# Patient Record
Sex: MALE | Race: WHITE | NOT HISPANIC OR LATINO | Employment: FULL TIME | URBAN - METROPOLITAN AREA
[De-identification: names, ages, dates, MRNs, and addresses within clinical notes are randomized per-mention and may not be internally consistent; named-entity substitution may affect disease eponyms.]

---

## 2018-01-23 ENCOUNTER — APPOINTMENT (OUTPATIENT)
Dept: LAB | Facility: CLINIC | Age: 43
End: 2018-01-23

## 2018-01-23 ENCOUNTER — TRANSCRIBE ORDERS (OUTPATIENT)
Dept: LAB | Facility: CLINIC | Age: 43
End: 2018-01-23

## 2018-01-23 DIAGNOSIS — Z02.1 PRE-EMPLOYMENT EXAMINATION: ICD-10-CM

## 2018-01-23 DIAGNOSIS — Z02.1 PRE-EMPLOYMENT EXAMINATION: Primary | ICD-10-CM

## 2018-01-23 LAB — RUBV IGG SERPL IA-ACNC: 40 IU/ML

## 2018-01-23 PROCEDURE — 86787 VARICELLA-ZOSTER ANTIBODY: CPT

## 2018-01-23 PROCEDURE — 86762 RUBELLA ANTIBODY: CPT

## 2018-01-23 PROCEDURE — 86765 RUBEOLA ANTIBODY: CPT

## 2018-01-23 PROCEDURE — 36415 COLL VENOUS BLD VENIPUNCTURE: CPT

## 2018-01-23 PROCEDURE — 86735 MUMPS ANTIBODY: CPT

## 2018-01-25 LAB
MEV IGG SER QL: NORMAL
MUV IGG SER QL: NORMAL
VZV IGG SER IA-ACNC: NORMAL

## 2018-08-15 ENCOUNTER — HOSPITAL ENCOUNTER (EMERGENCY)
Facility: HOSPITAL | Age: 43
Discharge: HOME | End: 2018-08-15
Attending: EMERGENCY MEDICINE | Admitting: EMERGENCY MEDICINE
Payer: COMMERCIAL

## 2018-08-15 ENCOUNTER — APPOINTMENT (EMERGENCY)
Dept: CT IMAGING | Facility: HOSPITAL | Age: 43
End: 2018-08-15
Payer: COMMERCIAL

## 2018-08-15 VITALS
BODY MASS INDEX: 31.1 KG/M2 | DIASTOLIC BLOOD PRESSURE: 69 MMHG | HEART RATE: 75 BPM | OXYGEN SATURATION: 95 % | HEIGHT: 69 IN | RESPIRATION RATE: 18 BRPM | WEIGHT: 210 LBS | SYSTOLIC BLOOD PRESSURE: 124 MMHG | TEMPERATURE: 98.2 F

## 2018-08-15 DIAGNOSIS — N23 RENAL COLIC ON LEFT SIDE: ICD-10-CM

## 2018-08-15 DIAGNOSIS — N13.2 URETERAL STONE WITH HYDRONEPHROSIS: Primary | ICD-10-CM

## 2018-08-15 LAB
ALBUMIN SERPL BCP-MCNC: 4.5 G/DL (ref 3.5–5.7)
ALP SERPL-CCNC: 43 U/L (ref 40–150)
ALT SERPL W P-5'-P-CCNC: 57 U/L (ref 7–52)
ANION GAP SERPL CALCULATED.3IONS-SCNC: 8 MMOL/L (ref 4–13)
AST SERPL W P-5'-P-CCNC: 31 U/L (ref 13–39)
BASOPHILS # BLD AUTO: 0.1 THOUSANDS/ΜL (ref 0–0.1)
BASOPHILS NFR BLD AUTO: 1 % (ref 0–1)
BILIRUB SERPL-MCNC: 0.4 MG/DL (ref 0.2–1)
BUN SERPL-MCNC: 24 MG/DL (ref 7–25)
CALCIUM SERPL-MCNC: 9.5 MG/DL (ref 8.6–10.5)
CHLORIDE SERPL-SCNC: 104 MMOL/L (ref 98–107)
CO2 SERPL-SCNC: 27 MMOL/L (ref 21–31)
CREAT SERPL-MCNC: 1.3 MG/DL (ref 0.7–1.3)
EOSINOPHIL # BLD AUTO: 0.1 THOUSAND/ΜL (ref 0–0.61)
EOSINOPHIL NFR BLD AUTO: 2 % (ref 0–6)
ERYTHROCYTE [DISTWIDTH] IN BLOOD BY AUTOMATED COUNT: 12.8 % (ref 11.6–15.1)
GFR SERPL CREATININE-BSD FRML MDRD: 66 ML/MIN/1.73SQ M
GLUCOSE SERPL-MCNC: 116 MG/DL (ref 65–140)
HCT VFR BLD AUTO: 46.3 % (ref 42–52)
HGB BLD-MCNC: 15.5 G/DL (ref 12–17)
LYMPHOCYTES # BLD AUTO: 1.3 THOUSANDS/ΜL (ref 0.6–4.47)
LYMPHOCYTES NFR BLD AUTO: 24 % (ref 14–44)
MCH RBC QN AUTO: 30 PG (ref 26.8–34.3)
MCHC RBC AUTO-ENTMCNC: 33.5 G/DL (ref 31.4–37.4)
MCV RBC AUTO: 90 FL (ref 82–98)
MONOCYTES # BLD AUTO: 0.4 THOUSAND/ΜL (ref 0.17–1.22)
MONOCYTES NFR BLD AUTO: 7 % (ref 4–12)
NEUTROPHILS # BLD AUTO: 3.7 THOUSANDS/ΜL (ref 1.85–7.62)
NEUTS SEG NFR BLD AUTO: 66 % (ref 43–75)
NRBC BLD AUTO-RTO: 0 /100 WBCS
PLATELET # BLD AUTO: 249 THOUSANDS/UL (ref 149–390)
PMV BLD AUTO: 8.1 FL (ref 8.9–12.7)
POTASSIUM SERPL-SCNC: 4.3 MMOL/L (ref 3.5–5.5)
PROT SERPL-MCNC: 6.9 G/DL (ref 6.4–8.9)
RBC # BLD AUTO: 5.17 MILLION/UL (ref 3.88–5.62)
SODIUM SERPL-SCNC: 139 MMOL/L (ref 134–143)
WBC # BLD AUTO: 5.5 THOUSAND/UL (ref 4.31–10.16)

## 2018-08-15 PROCEDURE — 96361 HYDRATE IV INFUSION ADD-ON: CPT

## 2018-08-15 PROCEDURE — 85025 COMPLETE CBC W/AUTO DIFF WBC: CPT | Performed by: EMERGENCY MEDICINE

## 2018-08-15 PROCEDURE — 80053 COMPREHEN METABOLIC PANEL: CPT | Performed by: EMERGENCY MEDICINE

## 2018-08-15 PROCEDURE — 99284 EMERGENCY DEPT VISIT MOD MDM: CPT

## 2018-08-15 PROCEDURE — 96375 TX/PRO/DX INJ NEW DRUG ADDON: CPT

## 2018-08-15 PROCEDURE — 36415 COLL VENOUS BLD VENIPUNCTURE: CPT | Performed by: EMERGENCY MEDICINE

## 2018-08-15 PROCEDURE — 74176 CT ABD & PELVIS W/O CONTRAST: CPT

## 2018-08-15 PROCEDURE — 96374 THER/PROPH/DIAG INJ IV PUSH: CPT

## 2018-08-15 RX ORDER — PRAVASTATIN SODIUM 20 MG
TABLET ORAL
COMMUNITY
End: 2022-04-07

## 2018-08-15 RX ORDER — ROSUVASTATIN CALCIUM 10 MG/1
TABLET, COATED ORAL
COMMUNITY
End: 2022-04-07

## 2018-08-15 RX ORDER — CYCLOBENZAPRINE HCL 5 MG
TABLET ORAL
COMMUNITY
End: 2022-04-07

## 2018-08-15 RX ORDER — KETOROLAC TROMETHAMINE 30 MG/ML
30 INJECTION, SOLUTION INTRAMUSCULAR; INTRAVENOUS ONCE
Status: COMPLETED | OUTPATIENT
Start: 2018-08-15 | End: 2018-08-15

## 2018-08-15 RX ORDER — ONDANSETRON 2 MG/ML
4 INJECTION INTRAMUSCULAR; INTRAVENOUS ONCE
Status: COMPLETED | OUTPATIENT
Start: 2018-08-15 | End: 2018-08-15

## 2018-08-15 RX ORDER — OXYCODONE HYDROCHLORIDE AND ACETAMINOPHEN 5; 325 MG/1; MG/1
1 TABLET ORAL EVERY 4 HOURS PRN
Qty: 20 TABLET | Refills: 0 | Status: SHIPPED | OUTPATIENT
Start: 2018-08-15 | End: 2018-08-25

## 2018-08-15 RX ORDER — SODIUM CHLORIDE 9 MG/ML
125 INJECTION, SOLUTION INTRAVENOUS CONTINUOUS
Status: DISCONTINUED | OUTPATIENT
Start: 2018-08-15 | End: 2018-08-15 | Stop reason: HOSPADM

## 2018-08-15 RX ORDER — TAMSULOSIN HYDROCHLORIDE 0.4 MG/1
0.4 CAPSULE ORAL
Qty: 10 CAPSULE | Refills: 0 | Status: SHIPPED | OUTPATIENT
Start: 2018-08-15 | End: 2022-04-07

## 2018-08-15 RX ADMIN — ONDANSETRON 4 MG: 2 INJECTION INTRAMUSCULAR; INTRAVENOUS at 05:11

## 2018-08-15 RX ADMIN — SODIUM CHLORIDE 125 ML/HR: 0.9 INJECTION, SOLUTION INTRAVENOUS at 05:10

## 2018-08-15 RX ADMIN — KETOROLAC TROMETHAMINE 30 MG: 30 INJECTION, SOLUTION INTRAMUSCULAR at 05:12

## 2018-08-15 NOTE — ED PROVIDER NOTES
History  Chief Complaint   Patient presents with    Abdominal Pain     reports left sided abdominal pain - reports does have history of back pain and when had x-rays of back incidentally told has left sided kidney stone - been awake since 01:30 due to pain     Patient is a 50-year-old male without significant past medical history who says that about 1:30 a m  developed pain in his left flank radiated to his left groin  He states never had this pain before  He is nauseous not vomiting  He has no frequency urgency or dysuria  He has noted nausea diarrhea or constipation  Weight is stable  The pain has become slightly worse  Of note patient had an x-ray about a year ago and told that he a kidney            None       No past medical history on file  No past surgical history on file  No family history on file  I have reviewed and agree with the history as documented  Social History   Substance Use Topics    Smoking status: Not on file    Smokeless tobacco: Not on file    Alcohol use Not on file        Review of Systems   Constitutional: Negative for chills, fatigue, fever and unexpected weight change  HENT: Negative for congestion and nosebleeds  Eyes: Negative for visual disturbance  Respiratory: Negative for chest tightness and shortness of breath  Cardiovascular: Negative for chest pain, palpitations and leg swelling  Gastrointestinal: Negative for abdominal pain, blood in stool, diarrhea, nausea and vomiting  Endocrine: Negative for cold intolerance and heat intolerance  Genitourinary: Negative for difficulty urinating  Musculoskeletal: Negative for arthralgias, back pain, gait problem, joint swelling and myalgias  Skin: Negative for rash  Neurological: Negative for dizziness, speech difficulty, weakness and headaches  Psychiatric/Behavioral: Negative for behavioral problems, confusion, self-injury and suicidal ideas     All other systems reviewed and are negative  Physical Exam  Physical Exam   Constitutional: He is oriented to person, place, and time  He appears well-developed and well-nourished  HENT:   Head: Normocephalic and atraumatic  Nose: Nose normal    Eyes: EOM are normal  Pupils are equal, round, and reactive to light  Neck: Normal range of motion  Neck supple  Cardiovascular: Normal rate, regular rhythm and normal heart sounds  Exam reveals no gallop and no friction rub  No murmur heard  Pulmonary/Chest: Effort normal and breath sounds normal  No respiratory distress  He has no wheezes  He has no rales  Abdominal: Soft  He exhibits no distension  There is no tenderness  There is no rebound and no guarding  Musculoskeletal: Normal range of motion  He exhibits no edema  Neurological: He is alert and oriented to person, place, and time  Skin: Skin is warm and dry  Psychiatric: He has a normal mood and affect  His behavior is normal  Judgment and thought content normal    Nursing note and vitals reviewed        Vital Signs  ED Triage Vitals [08/15/18 0448]   Temperature Pulse Respirations Blood Pressure SpO2   98 °F (36 7 °C) 68 18 142/80 95 %      Temp Source Heart Rate Source Patient Position - Orthostatic VS BP Location FiO2 (%)   Tympanic Monitor Sitting Left arm --      Pain Score       8           Vitals:    08/15/18 0448   BP: 142/80   Pulse: 68   Patient Position - Orthostatic VS: Sitting       Visual Acuity      ED Medications  Medications   sodium chloride 0 9 % infusion (not administered)   ketorolac (TORADOL) injection 30 mg (not administered)   ondansetron (ZOFRAN) injection 4 mg (not administered)       Diagnostic Studies  Results Reviewed     Procedure Component Value Units Date/Time    UA w Reflex to Microscopic [[id number]]     Lab Status:  No result Specimen:  Urine     Comprehensive metabolic panel [[id number]]     Lab Status:  No result Specimen:  Blood     CBC and differential [[id number]]     Lab Status:  No result Specimen:  Blood                  CT renal stone study abdomen pelvis without contrast    (Results Pending)              Procedures  Procedures       Phone Contacts  ED Phone Contact    ED Course                               [facility]  CritCare Time    Disposition  Final diagnoses:   None     ED Disposition     None      Follow-up Information    None         Patient's Medications    No medications on file     No discharge procedures on file      ED Provider  Electronically Signed by           Gilson Kwan MD  08/15/18 4487

## 2018-08-15 NOTE — ED NOTES
Patient resting quietly reporting slight relief from medications conversing with nurses - IV fluids infusing     Eros Murphy RN  08/15/18 [id number]

## 2018-08-15 NOTE — DISCHARGE INSTRUCTIONS
Hydronephrosis   WHAT YOU NEED TO KNOW:   Hydronephrosis is swelling in one or both kidneys caused by urine buildup  Urine normally flows from the kidneys to the bladder through tubes called ureters  A blockage in the ureters can prevent urine from flowing properly  Urine flow may also be prevented or slowed if your kidneys do not work correctly  Urine flows back into your urinary tract  Pressure builds up in the kidney and causes swelling  DISCHARGE INSTRUCTIONS:   Follow up with your healthcare provider or specialist as directed: You may need to be referred to a gynecologist, oncologist, or urologist for more tests and treatment  Write down your questions so you remember to ask them during your visits  Contact your healthcare provider if:   · Your abdomen feels full  · You have a change in how much or how often you urinate  · You urinate more times at night and in larger amounts than during the day  · You have mild lower back pain or pain on one side when you urinate  · You have questions or concerns about your condition or care  Return to the emergency department if:   · You have severe, stabbing back pain  · You have blood in your urine  · You cannot urinate, or you urinate very little  © 2017 [address] Information is for End User's use only and may not be sold, redistributed or otherwise used for commercial purposes  All illustrations and images included in CareNotes® are the copyrighted property of [employer] , [employer]  or Jasiel Landrum  The above information is an  only  It is not intended as medical advice for individual conditions or treatments  Talk to your doctor, nurse or pharmacist before following any medical regimen to see if it is safe and effective for you  Renal Colic   WHAT YOU NEED TO KNOW:   Renal colic is severe pain in your lower back or sides  The pain is usually on one side, but may be on both sides of your lower back  Renal colic may start quickly, come and go, and become worse over time  Renal colic is caused by a blockage in your urinary tract  The most common cause of a blockage is a kidney stone  Blood clots, ureter spasms, and dead tissue may also block your urinary tract  DISCHARGE INSTRUCTIONS:   Return to the emergency department if:   · You cannot stop vomiting  · You see new or increased bleeding when you urinate  · You are urinating less than usual, or not at all  · Your pain is not getting better even after treatment  Contact your healthcare provider if:   · You have fever  · You need to urinate more often than usual, or right away  · You see a stone in your urine strainer after you urinate  · You have questions or concerns about your condition or care  Medicines:   · Medicines  may help decrease pain and muscle spasms  You may also need medicine to calm your stomach and stop vomiting  · Take your medicine as directed  Contact your healthcare provider if you think your medicine is not helping or if you have side effects  Tell him of her if you are allergic to any medicine  Keep a list of the medicines, vitamins, and herbs you take  Include the amounts, and when and why you take them  Bring the list or the pill bottles to follow-up visits  Carry your medicine list with you in case of an emergency  Manage your symptoms:   · Drink liquids as directed  to help decrease pain and flush blockages from your urinary tract  Ask how much liquid to drink each day and which liquids are best for you  You may need to drink about 3 liters (12 glasses) of liquids each day  Half of your total daily liquids should be water  Limit coffee, tea, and soda to 2 cups daily  Your urine should be pale and clear  · Strain your urine every time you urinate  Urinate into a strainer (funnel with a fine mesh on the bottom) or glass jar to collect kidney stones   Give the kidney stones to your healthcare provider at your next visit  · Eat a variety of healthy foods  Healthy foods include fruits, vegetables, whole-grain breads, low-fat dairy products, beans, lean meats, and fish  You may need to increase the amount of citrus fruit you eat, such as oranges  Ask your healthcare provider how much salt, calcium, and protein you should eat  · Avoid activity in hot temperatures  Heat may cause you to become dehydrated and urinate less  Follow up with your healthcare provider as directed: You may need to return for tests to check if your blockage has cleared  Write down your questions so you remember to ask them during your visits  © 2017 [address] Information is for End User's use only and may not be sold, redistributed or otherwise used for commercial purposes  All illustrations and images included in CareNotes® are the copyrighted property of A D A M [employer]  or Jasiel Landrum  The above information is an  only  It is not intended as medical advice for individual conditions or treatments  Talk to your doctor, nurse or pharmacist before following any medical regimen to see if it is safe and effective for you

## 2018-08-15 NOTE — ED NOTES
md at bedside - patient to be discharged - urine strainer provided     Rito Galvan RN  08/15/18 7666

## 2018-08-15 NOTE — ED NOTES
Patient unable to void at this time - MD evaluated patient - IV being inserted and labs drawn - patient for CT scan     Yoselin Dela Cruz RN  08/15/18 8149

## 2020-01-23 ENCOUNTER — OFFICE VISIT (OUTPATIENT)
Dept: URGENT CARE | Facility: HOSPITAL | Age: 45
End: 2020-01-23
Payer: COMMERCIAL

## 2020-01-23 VITALS
SYSTOLIC BLOOD PRESSURE: 154 MMHG | OXYGEN SATURATION: 93 % | RESPIRATION RATE: 18 BRPM | DIASTOLIC BLOOD PRESSURE: 104 MMHG | BODY MASS INDEX: 31.64 KG/M2 | HEIGHT: 70 IN | HEART RATE: 118 BPM | WEIGHT: 221 LBS | TEMPERATURE: 99.1 F

## 2020-01-23 DIAGNOSIS — J11.1 INFLUENZA-LIKE ILLNESS: Primary | ICD-10-CM

## 2020-01-23 PROCEDURE — 99203 OFFICE O/P NEW LOW 30 MIN: CPT | Performed by: EMERGENCY MEDICINE

## 2020-01-23 RX ORDER — NAPROXEN 500 MG/1
TABLET ORAL
COMMUNITY
End: 2022-04-07

## 2020-01-23 RX ORDER — OSELTAMIVIR PHOSPHATE 75 MG/1
75 CAPSULE ORAL EVERY 12 HOURS SCHEDULED
Qty: 10 CAPSULE | Refills: 0 | Status: SHIPPED | OUTPATIENT
Start: 2020-01-23 | End: 2020-01-28

## 2020-01-23 NOTE — PROGRESS NOTES
[facility]        NAME: Cheri Gibbs is a 38 y o  male  : 1975    MRN: [id number]  DATE: 2020  TIME: 5:53 PM    Assessment and Plan   Influenza-like illness [R69]  1  Influenza-like illness  oseltamivir (TAMIFLU) 75 mg capsule         Patient Instructions     Patient Instructions     1  Claritin 12 hour for nasal stuffiness  2  Mucinex 600 mgs every 12 hours for cough  3  Humidify air in bedroom  4  Tylenol or Motrin for body aches  Influenza   WHAT YOU NEED TO KNOW:   Influenza (the flu) is an infection caused by the influenza virus  The flu is easily spread when an infected person coughs, sneezes, or has close contact with others  You may be able to spread the flu to others for 1 week or longer after signs or symptoms appear  DISCHARGE INSTRUCTIONS:   Call 911 for any of the following:   · You have trouble breathing, and your lips look purple or blue  · You have a seizure  Return to the emergency department if:   · You are dizzy, or you are urinating less or not at all  · You have a headache with a stiff neck, and you feel tired or confused  · You have new pain or pressure in your chest     · Your symptoms, such as shortness of breath, vomiting, or diarrhea, get worse  · Your symptoms, such as fever and coughing, seem to get better, but then get worse  Contact your healthcare provider if:   · You have new muscle pain or weakness  · You have questions or concerns about your condition or care  Medicines: You may need any of the following:  · Acetaminophen  decreases pain and fever  It is available without a doctor's order  Ask how much to take and how often to take it  Follow directions  Acetaminophen can cause liver damage if not taken correctly  · NSAIDs , such as ibuprofen, help decrease swelling, pain, and fever  This medicine is available with or without a doctor's order  NSAIDs can cause stomach bleeding or kidney problems in certain people  If you take blood thinner medicine, always ask your healthcare provider if NSAIDs are safe for you  Always read the medicine label and follow directions  · Antivirals  help fight a viral infection  · Take your medicine as directed  Contact your healthcare provider if you think your medicine is not helping or if you have side effects  Tell him or her if you are allergic to any medicine  Keep a list of the medicines, vitamins, and herbs you take  Include the amounts, and when and why you take them  Bring the list or the pill bottles to follow-up visits  Carry your medicine list with you in case of an emergency  Rest  as much as you can to help you recover  Drink liquids as directed  to help prevent dehydration  Ask how much liquid to drink each day and which liquids are best for you  Prevent the spread of influenza:   · Wash your hands often  Use soap and water  Wash your hands after you use the bathroom, change a child's diapers, or sneeze  Wash your hands before you prepare or eat food  Use gel hand cleanser when soap and water are not available  Do not touch your eyes, nose, or mouth unless you have washed your hands first            · Cover your mouth when you sneeze or cough  Cough into a tissue or the bend of your arm  · Clean shared items with a germ-killing   Clean table surfaces, doorknobs, and light switches  Do not share towels, silverware, and dishes with people who are sick  Wash bed sheets, towels, silverware, and dishes with soap and water  · Wear a mask  over your mouth and nose if you are sick or are near anyone who is sick  · Stay away from others  if you are sick  · Influenza vaccine  helps prevent influenza (flu)  Everyone older than 6 months should get a yearly influenza vaccine  Get the vaccine as soon as it is available, usually in September or October each year    Follow up with your healthcare provider as directed:  Write down your questions so you remember to ask them during your visits  © 2017 [address] Information is for End User's use only and may not be sold, redistributed or otherwise used for commercial purposes  All illustrations and images included in CareNotes® are the copyrighted property of A D A M [employer] Inc  or Jasiel Landrum  The above information is an  only  It is not intended as medical advice for individual conditions or treatments  Talk to your doctor, nurse or pharmacist before following any medical regimen to see if it is safe and effective for you  Follow up with PCP in 3-5 days  Proceed to  ER if symptoms worsen  Chief Complaint     Chief Complaint   Patient presents with    Generalized Body Aches     started a few days ago     Chills    Fatigue         History of Present Illness       THIS IS A 38YEAR-OLD MALE WHO PRESENTS WITH 2 DAYS OF BODY ACHES, LOW-GRADE FEVER, COUGH WITH SCANT SPUTUM  HE HAS NOT HAD A FLU SHOT THIS YEAR      Review of Systems   Review of Systems   Constitutional: Positive for fever  HENT: Positive for congestion and rhinorrhea  Negative for sore throat  Eyes: Negative for photophobia and discharge  Respiratory: Positive for cough  Gastrointestinal: Negative for diarrhea, nausea and vomiting  Musculoskeletal: Positive for myalgias  Skin: Negative for rash           Current Medications       Current Outpatient Medications:     naproxen (NAPROSYN) 500 mg tablet, naproxen 500 mg tablet  take 1 tablet by mouth twice a day with food, Disp: , Rfl:     cyclobenzaprine (FLEXERIL) 5 mg tablet, cyclobenzaprine 5 mg tablet, Disp: , Rfl:     oseltamivir (TAMIFLU) 75 mg capsule, Take 1 capsule (75 mg total) by mouth every 12 (twelve) hours for 5 days, Disp: 10 capsule, Rfl: 0    pravastatin (PRAVACHOL) 20 mg tablet, pravastatin 20 mg tablet, Disp: , Rfl:     rosuvastatin (CRESTOR) 10 MG tablet, rosuvastatin 10 mg tablet, Disp: , Rfl:     tamsulosin (FLOMAX) 0 4 mg, Take 1 capsule (0 4 mg total) by mouth daily with dinner (Patient not taking: Reported on 1/23/2020), Disp: 10 capsule, Rfl: 0    Current Allergies     Allergies as of 01/23/2020    (No Known Allergies)            The following portions of the patient's history were reviewed and updated as appropriate: allergies, current medications, past family history, past medical history, past social history, past surgical history and problem list      Past Medical History:   Diagnosis Date    Hyperlipidemia        Past Surgical History:   Procedure Laterality Date    HAND EXTENSOR TENDON EXCISION      KNEE ARTHROSCOPY         History reviewed  No pertinent family history  Medications have been verified  Objective   BP (!) 154/104   Pulse (!) 118   Temp 99 1 °F (37 3 °C) (Tympanic)   Resp 18   Ht 5' 10" (1 778 m)   Wt 100 kg (221 lb)   SpO2 93%   BMI 31 71 kg/m²        Physical Exam     Physical Exam   Constitutional: He is oriented to person, place, and time  He appears well-developed and well-nourished  HENT:   Head: Normocephalic and atraumatic  Right Ear: External ear normal    Left Ear: External ear normal    Mouth/Throat: Oropharynx is clear and moist  No oropharyngeal exudate  Minimal discharge bilaterally from the nasal cavity  Eyes: Pupils are equal, round, and reactive to light  Conjunctivae and EOM are normal    Neck: Normal range of motion  Neck supple  Cardiovascular: Normal rate, regular rhythm and normal heart sounds  No murmur heard  Pulmonary/Chest: Effort normal and breath sounds normal  He has no wheezes  He has no rales  Abdominal: Soft  He exhibits no distension and no mass  There is no tenderness  There is no guarding  Musculoskeletal: Normal range of motion  He exhibits no edema  Lymphadenopathy:     He has no cervical adenopathy  Neurological: He is alert and oriented to person, place, and time  Skin: Skin is warm and dry  No rash noted  No erythema  Psychiatric: He has a normal mood and affect  His behavior is normal    Nursing note and vitals reviewed

## 2020-01-23 NOTE — PATIENT INSTRUCTIONS
1   Claritin 12 hour for nasal stuffiness  2  Mucinex 600 mgs every 12 hours for cough  3  Humidify air in bedroom  4  Tylenol or Motrin for body aches  Influenza   WHAT YOU NEED TO KNOW:   Influenza (the flu) is an infection caused by the influenza virus  The flu is easily spread when an infected person coughs, sneezes, or has close contact with others  You may be able to spread the flu to others for 1 week or longer after signs or symptoms appear  DISCHARGE INSTRUCTIONS:   Call 911 for any of the following:   · You have trouble breathing, and your lips look purple or blue  · You have a seizure  Return to the emergency department if:   · You are dizzy, or you are urinating less or not at all  · You have a headache with a stiff neck, and you feel tired or confused  · You have new pain or pressure in your chest     · Your symptoms, such as shortness of breath, vomiting, or diarrhea, get worse  · Your symptoms, such as fever and coughing, seem to get better, but then get worse  Contact your healthcare provider if:   · You have new muscle pain or weakness  · You have questions or concerns about your condition or care  Medicines: You may need any of the following:  · Acetaminophen  decreases pain and fever  It is available without a doctor's order  Ask how much to take and how often to take it  Follow directions  Acetaminophen can cause liver damage if not taken correctly  · NSAIDs , such as ibuprofen, help decrease swelling, pain, and fever  This medicine is available with or without a doctor's order  NSAIDs can cause stomach bleeding or kidney problems in certain people  If you take blood thinner medicine, always ask your healthcare provider if NSAIDs are safe for you  Always read the medicine label and follow directions  · Antivirals  help fight a viral infection  · Take your medicine as directed    Contact your healthcare provider if you think your medicine is not helping or if you have side effects  Tell him or her if you are allergic to any medicine  Keep a list of the medicines, vitamins, and herbs you take  Include the amounts, and when and why you take them  Bring the list or the pill bottles to follow-up visits  Carry your medicine list with you in case of an emergency  Rest  as much as you can to help you recover  Drink liquids as directed  to help prevent dehydration  Ask how much liquid to drink each day and which liquids are best for you  Prevent the spread of influenza:   · Wash your hands often  Use soap and water  Wash your hands after you use the bathroom, change a child's diapers, or sneeze  Wash your hands before you prepare or eat food  Use gel hand cleanser when soap and water are not available  Do not touch your eyes, nose, or mouth unless you have washed your hands first            · Cover your mouth when you sneeze or cough  Cough into a tissue or the bend of your arm  · Clean shared items with a germ-killing   Clean table surfaces, doorknobs, and light switches  Do not share towels, silverware, and dishes with people who are sick  Wash bed sheets, towels, silverware, and dishes with soap and water  · Wear a mask  over your mouth and nose if you are sick or are near anyone who is sick  · Stay away from others  if you are sick  · Influenza vaccine  helps prevent influenza (flu)  Everyone older than 6 months should get a yearly influenza vaccine  Get the vaccine as soon as it is available, usually in September or October each year  Follow up with your healthcare provider as directed:  Write down your questions so you remember to ask them during your visits  © 2017 [address]  Information is for End User's use only and may not be sold, redistributed or otherwise used for commercial purposes  All illustrations and images included in CareNotes® are the copyrighted property of [employer]  or [employer]iel Landrum    The above information is an  only  It is not intended as medical advice for individual conditions or treatments  Talk to your doctor, nurse or pharmacist before following any medical regimen to see if it is safe and effective for you

## 2021-04-08 DIAGNOSIS — Z23 ENCOUNTER FOR IMMUNIZATION: ICD-10-CM

## 2022-04-07 ENCOUNTER — OFFICE VISIT (OUTPATIENT)
Dept: FAMILY MEDICINE CLINIC | Facility: CLINIC | Age: 47
End: 2022-04-07
Payer: COMMERCIAL

## 2022-04-07 VITALS
SYSTOLIC BLOOD PRESSURE: 134 MMHG | DIASTOLIC BLOOD PRESSURE: 84 MMHG | OXYGEN SATURATION: 98 % | HEART RATE: 71 BPM | HEIGHT: 69 IN | WEIGHT: 213 LBS | TEMPERATURE: 98 F | BODY MASS INDEX: 31.55 KG/M2

## 2022-04-07 DIAGNOSIS — Z11.59 ENCOUNTER FOR HEPATITIS C SCREENING TEST FOR LOW RISK PATIENT: ICD-10-CM

## 2022-04-07 DIAGNOSIS — Z11.4 SCREENING FOR HIV (HUMAN IMMUNODEFICIENCY VIRUS): ICD-10-CM

## 2022-04-07 DIAGNOSIS — Z83.49 FAMILY HISTORY OF THYROID DISEASE IN FATHER: ICD-10-CM

## 2022-04-07 DIAGNOSIS — K21.9 GASTROESOPHAGEAL REFLUX DISEASE WITHOUT ESOPHAGITIS: ICD-10-CM

## 2022-04-07 DIAGNOSIS — Z13.1 SCREENING FOR DIABETES MELLITUS (DM): ICD-10-CM

## 2022-04-07 DIAGNOSIS — Z00.00 ANNUAL PHYSICAL EXAM: Primary | ICD-10-CM

## 2022-04-07 PROCEDURE — 3725F SCREEN DEPRESSION PERFORMED: CPT | Performed by: NURSE PRACTITIONER

## 2022-04-07 PROCEDURE — 1036F TOBACCO NON-USER: CPT | Performed by: NURSE PRACTITIONER

## 2022-04-07 PROCEDURE — 99386 PREV VISIT NEW AGE 40-64: CPT | Performed by: NURSE PRACTITIONER

## 2022-04-07 PROCEDURE — 3008F BODY MASS INDEX DOCD: CPT | Performed by: NURSE PRACTITIONER

## 2022-04-07 RX ORDER — PANTOPRAZOLE SODIUM 40 MG/1
40 TABLET, DELAYED RELEASE ORAL DAILY
Qty: 90 TABLET | Refills: 3 | Status: SHIPPED | OUTPATIENT
Start: 2022-04-07

## 2022-04-07 RX ORDER — OMEPRAZOLE 20 MG/1
TABLET, DELAYED RELEASE ORAL
COMMUNITY
Start: 2022-01-10 | End: 2022-04-07

## 2022-04-07 NOTE — PROGRESS NOTES
ADULT ANNUAL [facility]    NAME: Domingo Espinoza  AGE: 49 y o  SEX: male  : 1975     DATE: 2022     Assessment and Plan:     Problem List Items Addressed This Visit     None      Visit Diagnoses     Annual physical exam    -  Primary    Relevant Orders    Lipid panel    Comprehensive metabolic panel    TSH, 3rd generation with Free T4 reflex    CBC and differential    Family history of thyroid disease in father        Relevant Orders    TSH, 3rd generation with Free T4 reflex    Gastroesophageal reflux disease without esophagitis        Relevant Medications    pantoprazole (PROTONIX) 40 mg tablet    Screening for HIV (human immunodeficiency virus)        Relevant Orders    HIV 1/2 Antigen/Antibody (4th Generation) w Reflex [facility]    Encounter for hepatitis C screening test for low risk patient        Relevant Orders    Hepatitis C antibody    Screening for diabetes mellitus (DM)        Relevant Orders    HEMOGLOBIN A1C W/ EAG ESTIMATION      1  Annual physical exam    - Lipid panel; Future  - Comprehensive metabolic panel; Future  - TSH, 3rd generation with Free T4 reflex; Future  - CBC and differential; Future    2  Family history of thyroid disease in father  - TSH, 3rd generation with Free T4 reflex; Future    3  Gastroesophageal reflux disease without esophagitis  Coughing due to acid reflux  Dentist has bene telling him he had acid reflux as it was affecting his lower teeth  Was taking OTC 20mg PPI with some relief  Will try Protonix  - pantoprazole (PROTONIX) 40 mg tablet; Take 1 tablet (40 mg total) by mouth daily  Dispense: 90 tablet; Refill: 3    4  Screening for HIV (human immunodeficiency virus)    - HIV 1/2 Antigen/Antibody (4th Generation) w Reflex [facility]; Future    5  Encounter for hepatitis C screening test for low risk patient  - Hepatitis C antibody; Future    6   Screening for diabetes mellitus (DM)    - HEMOGLOBIN A1C W/ EAG ESTIMATION; Future      Immunizations and preventive care screenings were discussed with patient today  Appropriate education was printed on patient's after visit summary  Counseling:  Alcohol/drug use: discussed moderation in alcohol intake, the recommendations for healthy alcohol use, and avoidance of illicit drug use  Sexual health: discussed sexually transmitted diseases, partner selection, use of condoms, avoidance of unintended pregnancy, and contraceptive alternatives  · Exercise: the importance of regular exercise/physical activity was discussed  Recommend exercise 3-5 times per week for at least 30 minutes  BMI Counseling: Body mass index is 31 45 kg/m²  The BMI is above normal  Nutrition recommendations include encouraging healthy choices of fruits and vegetables, limiting drinks that contain sugar, moderation in carbohydrate intake, reducing intake of saturated and trans fat and reducing intake of cholesterol  Exercise recommendations include exercising 3-5 times per week  Rationale for BMI follow-up plan is due to patient being overweight or obese  Depression Screening and Follow-up Plan: Patient was screened for depression during today's encounter  They screened negative with a PHQ-2 score of 0  No follow-ups on file  Chief Complaint:     Chief Complaint   Patient presents with   [address] [place] [address] Annual Exam     Has bump on stomach  Is wondering if it is a hernia  Also mentions acid reflux and cough  History of Present Illness:     Adult Annual Physical   Patient here for a comprehensive physical exam  The patient reports problems - see below  Answers for HPI/ROS submitted by the patient on 4/5/2022  Chronicity: chronic  Onset: more than 1 month ago  Progression since onset: gradually worsening  Frequency: every few hours  Cough characteristics: non-productive  ear congestion: No  heartburn: No  hemoptysis: No  nasal congestion: No  sweats:  No  weight loss: No  Aggravated by: exercise, lying down      Diet and Physical Activity  · Diet/Nutrition: poor diet, limited junk food, limited fruits/vegetables and reports carnivore  · Exercise: moderate cardiovascular exercise and 3-4 times a week on average  Depression Screening  PHQ-2/9 Depression Screening    Little interest or pleasure in doing things: 0 - not at all  Feeling down, depressed, or hopeless: 0 - not at all  PHQ-2 Score: 0  PHQ-2 Interpretation: Negative depression screen       General Health  · Sleep: sleeps well  · Hearing: normal - bilateral   · Vision: most recent eye exam <1 year ago  · Dental: regular dental visits and brushes teeth twice daily   Health  · Symptoms include: none     Review of Systems:     Review of Systems   Constitutional: Negative for chills and fever  HENT: Negative for ear pain, postnasal drip, rhinorrhea and sore throat  Respiratory: Positive for cough and shortness of breath  Negative for wheezing  Cardiovascular: Negative for chest pain  Musculoskeletal: Negative for myalgias  Skin: Negative for rash  Neurological: Negative for headaches  Past Medical History:     Past Medical History:   Diagnosis Date    Hyperlipidemia       Past Surgical History:     Past Surgical History:   Procedure Laterality Date    HAND EXTENSOR TENDON EXCISION      KNEE ARTHROSCOPY        Family History:     No family history on file     Social History:     Social History     Socioeconomic History    Marital status: Single     Spouse name: None    Number of children: None    Years of education: None    Highest education level: None   Occupational History    None   Tobacco Use    Smoking status: Never Smoker    Smokeless tobacco: Never Used   Substance and Sexual Activity    Alcohol use: Never     Comment: reports socially drinks alcohol    Drug use: Never    Sexual activity: None   Other Topics Concern    None   Social History Narrative    None     Social Determinants of Health     Financial Resource Strain: Not on file   Food Insecurity: Not on file   Transportation Needs: Not on file   Physical Activity: Not on file   Stress: Not on file   Social Connections: Not on file   Intimate Partner Violence: Not on file   Housing Stability: Not on file      Current Medications:     Current Outpatient Medications   Medication Sig Dispense Refill    pantoprazole (PROTONIX) 40 mg tablet Take 1 tablet (40 mg total) by mouth daily 90 tablet 3     No current facility-administered medications for this visit  Allergies:     No Known Allergies   Physical Exam:     /84   Pulse 71   Temp 98 °F (36 7 °C)   Ht 5' 9" (1 753 m)   Wt 96 6 kg (213 lb)   SpO2 98%   BMI 31 45 kg/m²     Physical Exam  Vitals and nursing note reviewed  Constitutional:       General: He is not in acute distress  Appearance: He is well-developed  He is not diaphoretic  HENT:      Head: Normocephalic and atraumatic  Right Ear: Tympanic membrane, ear canal and external ear normal       Left Ear: Tympanic membrane and ear canal normal       Nose: Nose normal  No congestion or rhinorrhea  Mouth/Throat:      Mouth: Mucous membranes are moist       Pharynx: No oropharyngeal exudate  Cardiovascular:      Rate and Rhythm: Normal rate and regular rhythm  Heart sounds: Normal heart sounds  No murmur heard  Pulmonary:      Effort: Pulmonary effort is normal  No respiratory distress  Breath sounds: Normal breath sounds  No wheezing or rales  Abdominal:      General: Bowel sounds are normal  There is no distension  Palpations: Abdomen is soft  Tenderness: There is no abdominal tenderness  There is no guarding  Hernia: No hernia is present  Musculoskeletal:         General: Normal range of motion  Skin:     General: Skin is warm and dry  Capillary Refill: Capillary refill takes less than 2 seconds  Findings: No erythema or rash     Neurological: Mental Status: He is alert  Psychiatric:         Behavior: Behavior normal  Behavior is cooperative  Thought Content:  Thought content normal           DEVANG Garcia  [facility]

## 2022-04-07 NOTE — PATIENT INSTRUCTIONS

## 2022-04-08 ENCOUNTER — APPOINTMENT (OUTPATIENT)
Dept: LAB | Facility: CLINIC | Age: 47
End: 2022-04-08
Payer: COMMERCIAL

## 2022-04-08 DIAGNOSIS — Z13.1 SCREENING FOR DIABETES MELLITUS (DM): ICD-10-CM

## 2022-04-08 DIAGNOSIS — Z00.00 ANNUAL PHYSICAL EXAM: ICD-10-CM

## 2022-04-08 DIAGNOSIS — Z11.59 ENCOUNTER FOR HEPATITIS C SCREENING TEST FOR LOW RISK PATIENT: ICD-10-CM

## 2022-04-08 DIAGNOSIS — Z11.4 SCREENING FOR HIV (HUMAN IMMUNODEFICIENCY VIRUS): ICD-10-CM

## 2022-04-08 DIAGNOSIS — Z83.49 FAMILY HISTORY OF THYROID DISEASE IN FATHER: ICD-10-CM

## 2022-04-08 LAB
ALBUMIN SERPL BCP-MCNC: 4.3 G/DL (ref 3.5–5)
ALP SERPL-CCNC: 61 U/L (ref 46–116)
ALT SERPL W P-5'-P-CCNC: 55 U/L (ref 12–78)
ANION GAP SERPL CALCULATED.3IONS-SCNC: 4 MMOL/L (ref 4–13)
AST SERPL W P-5'-P-CCNC: 30 U/L (ref 5–45)
BASOPHILS # BLD AUTO: 0.05 THOUSANDS/ΜL (ref 0–0.1)
BASOPHILS NFR BLD AUTO: 1 % (ref 0–1)
BILIRUB SERPL-MCNC: 0.69 MG/DL (ref 0.2–1)
BUN SERPL-MCNC: 25 MG/DL (ref 5–25)
CALCIUM SERPL-MCNC: 9.5 MG/DL (ref 8.3–10.1)
CHLORIDE SERPL-SCNC: 106 MMOL/L (ref 100–108)
CHOLEST SERPL-MCNC: 259 MG/DL
CO2 SERPL-SCNC: 29 MMOL/L (ref 21–32)
CREAT SERPL-MCNC: 1.17 MG/DL (ref 0.6–1.3)
EOSINOPHIL # BLD AUTO: 0.1 THOUSAND/ΜL (ref 0–0.61)
EOSINOPHIL NFR BLD AUTO: 2 % (ref 0–6)
ERYTHROCYTE [DISTWIDTH] IN BLOOD BY AUTOMATED COUNT: 12.2 % (ref 11.6–15.1)
EST. AVERAGE GLUCOSE BLD GHB EST-MCNC: 103 MG/DL
GFR SERPL CREATININE-BSD FRML MDRD: 73 ML/MIN/1.73SQ M
GLUCOSE P FAST SERPL-MCNC: 101 MG/DL (ref 65–99)
HBA1C MFR BLD: 5.2 %
HCT VFR BLD AUTO: 45.9 % (ref 36.5–49.3)
HCV AB SER QL: NORMAL
HDLC SERPL-MCNC: 45 MG/DL
HGB BLD-MCNC: 15.4 G/DL (ref 12–17)
IMM GRANULOCYTES # BLD AUTO: 0.02 THOUSAND/UL (ref 0–0.2)
IMM GRANULOCYTES NFR BLD AUTO: 0 % (ref 0–2)
LDLC SERPL CALC-MCNC: 140 MG/DL (ref 0–100)
LYMPHOCYTES # BLD AUTO: 1.48 THOUSANDS/ΜL (ref 0.6–4.47)
LYMPHOCYTES NFR BLD AUTO: 29 % (ref 14–44)
MCH RBC QN AUTO: 29.9 PG (ref 26.8–34.3)
MCHC RBC AUTO-ENTMCNC: 33.6 G/DL (ref 31.4–37.4)
MCV RBC AUTO: 89 FL (ref 82–98)
MONOCYTES # BLD AUTO: 0.46 THOUSAND/ΜL (ref 0.17–1.22)
MONOCYTES NFR BLD AUTO: 9 % (ref 4–12)
NEUTROPHILS # BLD AUTO: 3.09 THOUSANDS/ΜL (ref 1.85–7.62)
NEUTS SEG NFR BLD AUTO: 59 % (ref 43–75)
NONHDLC SERPL-MCNC: 214 MG/DL
NRBC BLD AUTO-RTO: 0 /100 WBCS
PLATELET # BLD AUTO: 253 THOUSANDS/UL (ref 149–390)
PMV BLD AUTO: 10.7 FL (ref 8.9–12.7)
POTASSIUM SERPL-SCNC: 4.1 MMOL/L (ref 3.5–5.3)
PROT SERPL-MCNC: 7.5 G/DL (ref 6.4–8.2)
RBC # BLD AUTO: 5.15 MILLION/UL (ref 3.88–5.62)
SODIUM SERPL-SCNC: 139 MMOL/L (ref 136–145)
TRIGL SERPL-MCNC: 371 MG/DL
TSH SERPL DL<=0.05 MIU/L-ACNC: 1.04 UIU/ML (ref 0.45–4.5)
WBC # BLD AUTO: 5.2 THOUSAND/UL (ref 4.31–10.16)

## 2022-04-08 PROCEDURE — 85025 COMPLETE CBC W/AUTO DIFF WBC: CPT

## 2022-04-08 PROCEDURE — 84443 ASSAY THYROID STIM HORMONE: CPT

## 2022-04-08 PROCEDURE — 83036 HEMOGLOBIN GLYCOSYLATED A1C: CPT

## 2022-04-08 PROCEDURE — 36415 COLL VENOUS BLD VENIPUNCTURE: CPT

## 2022-04-08 PROCEDURE — 86803 HEPATITIS C AB TEST: CPT

## 2022-04-08 PROCEDURE — 80061 LIPID PANEL: CPT

## 2022-04-08 PROCEDURE — 80053 COMPREHEN METABOLIC PANEL: CPT

## 2022-04-08 PROCEDURE — 87389 HIV-1 AG W/HIV-1&-2 AB AG IA: CPT

## 2022-04-09 LAB — HIV 1+2 AB+HIV1 P24 AG SERPL QL IA: NORMAL

## 2022-04-13 DIAGNOSIS — E78.00 ELEVATED CHOLESTEROL: Primary | ICD-10-CM

## 2023-04-07 DIAGNOSIS — K21.9 GASTROESOPHAGEAL REFLUX DISEASE WITHOUT ESOPHAGITIS: ICD-10-CM

## 2023-04-07 RX ORDER — PANTOPRAZOLE SODIUM 40 MG/1
40 TABLET, DELAYED RELEASE ORAL DAILY
Qty: 90 TABLET | Refills: 3 | Status: SHIPPED | OUTPATIENT
Start: 2023-04-07

## 2023-04-07 NOTE — TELEPHONE ENCOUNTER
----- Message from KIMANI Antonio sent at 4/7/2023  8:41 AM EDT -----  Regarding: RE: Acid Reflux Back  Contact: [phone]  Please enter protonix refill      ----- Message -----  From: Alana Castle  Sent: 4/7/2023   6:34 AM EDT  To: DEVANG Clemens  Subject: FW: Acid Reflux Back                               ----- Message -----  From: Bethanie Sifuentes  Sent: 4/6/2023   7:20 PM EDT  To: [facility]  Subject: Acid Reflux Back                                 Hi, I had to reschedule my appointment to May  I noticed though that I only have enough of the protonix for 3 more days with no refills on the bottle  Should I get a refill of this?     Thanks,  Dong Berman

## 2023-05-05 ENCOUNTER — OFFICE VISIT (OUTPATIENT)
Dept: FAMILY MEDICINE CLINIC | Facility: CLINIC | Age: 48
End: 2023-05-05

## 2023-05-05 VITALS
WEIGHT: 222 LBS | SYSTOLIC BLOOD PRESSURE: 148 MMHG | TEMPERATURE: 97.8 F | OXYGEN SATURATION: 96 % | HEIGHT: 69 IN | HEART RATE: 70 BPM | BODY MASS INDEX: 32.88 KG/M2 | DIASTOLIC BLOOD PRESSURE: 100 MMHG

## 2023-05-05 DIAGNOSIS — I10 PRIMARY HYPERTENSION: ICD-10-CM

## 2023-05-05 DIAGNOSIS — K21.9 GASTROESOPHAGEAL REFLUX DISEASE WITHOUT ESOPHAGITIS: ICD-10-CM

## 2023-05-05 DIAGNOSIS — Z12.11 COLON CANCER SCREENING: Primary | ICD-10-CM

## 2023-05-05 DIAGNOSIS — E78.2 MIXED HYPERLIPIDEMIA: ICD-10-CM

## 2023-05-05 DIAGNOSIS — Z00.00 ANNUAL PHYSICAL EXAM: ICD-10-CM

## 2023-05-05 DIAGNOSIS — Z83.49 FAMILY HISTORY OF THYROID DISEASE IN FATHER: ICD-10-CM

## 2023-05-05 RX ORDER — LISINOPRIL 10 MG/1
10 TABLET ORAL DAILY
Qty: 30 TABLET | Refills: 3 | Status: SHIPPED | OUTPATIENT
Start: 2023-05-05

## 2023-05-05 RX ORDER — FAMOTIDINE 40 MG/1
40 TABLET, FILM COATED ORAL DAILY
COMMUNITY

## 2023-05-05 NOTE — PATIENT INSTRUCTIONS
Diet for Stomach Ulcers and Gastritis   WHAT YOU NEED TO KNOW:   A diet for stomach ulcers and gastritis is a meal plan that limits foods that irritate your stomach  Certain foods may worsen symptoms such as stomach pain, bloating, heartburn, or indigestion  DISCHARGE INSTRUCTIONS:   Foods to limit or avoid:  You may need to avoid acidic, spicy, or high-fat foods  Not all foods affect everyone the same way  You will need to learn which foods worsen your symptoms and limit those foods  The following are some foods that may worsen ulcer or gastritis symptoms:  Beverages:      Whole milk and chocolate milk    Hot cocoa and cola    Any beverage with caffeine    Regular and decaffeinated coffee    Peppermint and spearmint tea    Green and black tea, with or without caffeine    Orange and grapefruit juices    Drinks that contain alcohol    Spices and seasonings:      Black and red pepper    Chili powder    Mustard seed and nutmeg    Other foods:      Dairy foods made from whole milk or cream    Chocolate    Spicy or strongly flavored cheeses, such as jalapeno or black pepper    Highly seasoned, high-fat meats, such as sausage, salami, bhatia, ham, and cold cuts    Hot chiles and peppers    Tomato products, such as tomato paste, tomato sauce, or tomato juice    Foods to include:  Eat a variety of healthy foods from all the food groups  Eat fruits, vegetables, whole grains, and fat-free or low-fat dairy foods  Whole grains include whole-wheat breads, cereals, pasta, and brown rice  Choose lean meats, poultry (chicken and turkey), fish, beans, eggs, and nuts  A healthy meal plan is low in unhealthy fats, salt, and added sugar  Healthy fats include olive oil and canola oil  Ask your dietitian for more information about a healthy diet  Other helpful guidelines:   Do not eat right before bedtime  Stop eating at least 2 hours before bedtime  Eat small, frequent meals    Your stomach may tolerate small, frequent meals better than large meals  © Copyright [employer] 2022 Information is for End User's use only and may not be sold, redistributed or otherwise used for commercial purposes  The above information is an  only  It is not intended as medical advice for individual conditions or treatments  Talk to your doctor, nurse or pharmacist before following any medical regimen to see if it is safe and effective for you  Wellness Visit for Adults   AMBULATORY CARE:   A wellness visit  is when you see your healthcare provider to get screened for health problems  Your healthcare provider will also give you advice on how to stay healthy  Write down your questions so you remember to ask them  Ask your healthcare provider how often you should have a wellness visit  What happens at a wellness visit:  Your healthcare provider will ask about your health, and your family history of health problems  This includes high blood pressure, heart disease, and cancer  He or she will ask if you have symptoms that concern you, if you smoke, and about your mood  You may also be asked about your intake of medicines, supplements, food, and alcohol  Any of the following may be done: Your weight  will be checked  Your height may also be checked so your body mass index (BMI) can be calculated  Your BMI shows if you are at a healthy weight  Your blood pressure  and heart rate will be checked  Your temperature may also be checked  Blood and urine tests  may be done  Blood tests may be done to check your cholesterol levels  Abnormal cholesterol levels increase your risk for heart disease and stroke  You may also need a blood or urine test to check for diabetes if you are at increased risk  Urine tests may be done to look for signs of an infection or kidney disease  A physical exam  includes checking your heartbeat and lungs with a stethoscope  Your healthcare provider may also check your skin to look for sun damage      Screening tests  may be recommended  A screening test is done to check for diseases that may not cause symptoms  The screening tests you may need depend on your age, gender, family history, and lifestyle habits  For example, colorectal screening may be recommended if you are 48years old or older  Screening tests you need if you are a woman:   A Pap smear  is used to screen for cervical cancer  Pap smears are usually done every 3 to 5 years depending on your age  You may need them more often if you have had abnormal Pap smear test results in the past  Ask your healthcare provider how often you should have a Pap smear  A mammogram  is an x-ray of your breasts to screen for breast cancer  Experts recommend mammograms every 2 years starting at age 48 years  You may need a mammogram at age 52 years or younger if you have an increased risk for breast cancer  Talk to your healthcare provider about when you should start having mammograms and how often you need them  Vaccines you may need:   Get an influenza vaccine  every year  The influenza vaccine protects you from the flu  Several types of viruses cause the flu  The viruses change over time, so new vaccines are made each year  Get a tetanus-diphtheria (Td) booster vaccine  every 10 years  This vaccine protects you against tetanus and diphtheria  Tetanus is a severe infection that may cause painful muscle spasms and lockjaw  Diphtheria is a severe bacterial infection that causes a thick covering in the back of your mouth and throat  Get a human papillomavirus (HPV) vaccine  if you are female and aged 23 to 32 or male 23 to 24 and never received it  This vaccine protects you from HPV infection  HPV is the most common infection spread by sexual contact  HPV may also cause vaginal, penile, and anal cancers  Get a pneumococcal vaccine  if you are aged 72 years or older  The pneumococcal vaccine is an injection given to protect you from pneumococcal disease  Pneumococcal disease is an infection caused by pneumococcal bacteria  The infection may cause pneumonia, meningitis, or an ear infection  Get a shingles vaccine  if you are 60 or older, even if you have had shingles before  The shingles vaccine is an injection to protect you from the varicella-zoster virus  This is the same virus that causes chickenpox  Shingles is a painful rash that develops in people who had chickenpox or have been exposed to the virus  How to eat healthy:  My Plate is a model for planning healthy meals  It shows the types and amounts of foods that should go on your plate  Fruits and vegetables make up about half of your plate, and grains and protein make up the other half  A serving of dairy is included on the side of your plate  The amount of calories and serving sizes you need depends on your age, gender, weight, and height  Examples of healthy foods are listed below:  Eat a variety of vegetables  such as dark green, red, and orange vegetables  You can also include canned vegetables low in sodium (salt) and frozen vegetables without added butter or sauces  Eat a variety of fresh fruits , canned fruit in 100% juice, frozen fruit, and dried fruit  Include whole grains  At least half of the grains you eat should be whole grains  Examples include whole-wheat bread, wheat pasta, brown rice, and whole-grain cereals such as oatmeal     Eat a variety of protein foods such as seafood (fish and shellfish), lean meat, and poultry without skin (turkey and chicken)  Examples of lean meats include pork leg, shoulder, or tenderloin, and beef round, sirloin, tenderloin, and extra lean ground beef  Other protein foods include eggs and egg substitutes, beans, peas, soy products, nuts, and seeds  Choose low-fat dairy products such as skim or 1% milk or low-fat yogurt, cheese, and cottage cheese  Limit unhealthy fats  such as butter, hard margarine, and shortening         Exercise:  Exercise at least 30 minutes per day on most days of the week  Some examples of exercise include walking, biking, dancing, and swimming  You can also fit in more physical activity by taking the stairs instead of the elevator or parking farther away from stores  Include muscle strengthening activities 2 days each week  Regular exercise provides many health benefits  It helps you manage your weight, and decreases your risk for type 2 diabetes, heart disease, stroke, and high blood pressure  Exercise can also help improve your mood  Ask your healthcare provider about the best exercise plan for you  General health and safety guidelines:   Do not smoke  Nicotine and other chemicals in cigarettes and cigars can cause lung damage  Ask your healthcare provider for information if you currently smoke and need help to quit  E-cigarettes or smokeless tobacco still contain nicotine  Talk to your healthcare provider before you use these products  Limit alcohol  A drink of alcohol is 12 ounces of beer, 5 ounces of wine, or 1½ ounces of liquor  Lose weight, if needed  Being overweight increases your risk of certain health conditions  These include heart disease, high blood pressure, type 2 diabetes, and certain types of cancer  Protect your skin  Do not sunbathe or use tanning beds  Use sunscreen with a SPF 15 or higher  Apply sunscreen at least 15 minutes before you go outside  Reapply sunscreen every 2 hours  Wear protective clothing, hats, and sunglasses when you are outside  Drive safely  Always wear your seatbelt  Make sure everyone in your car wears a seatbelt  A seatbelt can save your life if you are in an accident  Do not use your cell phone when you are driving  This could distract you and cause an accident  Pull over if you need to make a call or send a text message  Practice safe sex  Use latex condoms if are sexually active and have more than one partner   Your healthcare provider may recommend screening tests for sexually transmitted infections (STIs)  Wear helmets, lifejackets, and protective gear  Always wear a helmet when you ride a bike or motorcycle, go skiing, or play sports that could cause a head injury  Wear protective equipment when you play sports  Wear a lifejacket when you are on a boat or doing water sports  © Copyright [employer] 2022 Information is for End User's use only and may not be sold, redistributed or otherwise used for commercial purposes  The above information is an  only  It is not intended as medical advice for individual conditions or treatments  Talk to your doctor, nurse or pharmacist before following any medical regimen to see if it is safe and effective for you

## 2023-05-05 NOTE — PROGRESS NOTES
ADULT ANNUAL [facility]    NAME: Caity Turcios  AGE: 51 y o  SEX: male  : 1975     DATE: 2023     Assessment and Plan:     Problem List Items Addressed This Visit    None  Visit Diagnoses     Colon cancer screening    -  Primary    Relevant Orders    Ambulatory Referral to Gastroenterology    Annual physical exam        Relevant Orders    Lipid panel    Comprehensive metabolic panel    TSH, 3rd generation with Free T4 reflex    Family history of thyroid disease in father        Relevant Orders    TSH, 3rd generation with Free T4 reflex    Mixed hyperlipidemia        Relevant Orders    Lipid panel    Gastroesophageal reflux disease without esophagitis        Relevant Medications    famotidine (PEPCID) 40 MG tablet    Other Relevant Orders    Ambulatory Referral to Gastroenterology    Primary hypertension        Relevant Medications    lisinopril (ZESTRIL) 10 mg tablet        1  Colon cancer screening  Agreeable to colonoscopy  May need EGD due to ongoing acid reflux    - Ambulatory Referral to Gastroenterology; Future    2  Annual physical exam    - Lipid panel; Future  - Comprehensive metabolic panel; Future  - TSH, 3rd generation with Free T4 reflex; Future    3  Family history of thyroid disease in father    - TSH, 3rd generation with Free T4 reflex; Future    4  Mixed hyperlipidemia    - Lipid panel; Future    5  Gastroesophageal reflux disease without esophagitis  Avoid triggers  List given  Continue protonix and pepcid  - Ambulatory Referral to Gastroenterology; Future    6  Primary hypertension  History of HTN 10 years ago  Will restart medication today  Follow up in 4 weeks  Denies any symptoms  - lisinopril (ZESTRIL) 10 mg tablet; Take 1 tablet (10 mg total) by mouth daily  Dispense: 30 tablet; Refill: 3    Immunizations and preventive care screenings were discussed with patient today   Appropriate education was printed on patient's after visit summary  Counseling:  Alcohol/drug use: discussed moderation in alcohol intake, the recommendations for healthy alcohol use, and avoidance of illicit drug use  Dental Health: discussed importance of regular tooth brushing, flossing, and dental visits  · Exercise: the importance of regular exercise/physical activity was discussed  Recommend exercise 3-5 times per week for at least 30 minutes  Return in 1 year (on 5/5/2024)  Chief Complaint:     Chief Complaint   Patient presents with    Annual Exam      History of Present Illness:     Adult Annual Physical   Patient here for a comprehensive physical exam  The patient reports problems - GERD but improved  Diet and Physical Activity  · Diet/Nutrition: well balanced diet and limited junk food  · Exercise: moderate cardiovascular exercise, strength training exercises and 4-5 days a week  Depression Screening  PHQ-2/9 Depression Screening    Little interest or pleasure in doing things: 0 - not at all  Feeling down, depressed, or hopeless: 0 - not at all  PHQ-2 Score: 0  PHQ-2 Interpretation: Negative depression screen       General Health  · Sleep: sleeps well and gets 7-8 hours of sleep on average  · Hearing: normal - bilateral   · Vision: vision problems: trouble up close and most recent eye exam <1 year ago  · Dental: regular dental visits, brushes teeth twice daily and does not floss   Health  · Symptoms include: none     Review of Systems:     Review of Systems   Constitutional: Negative for activity change, appetite change, chills, fatigue and fever  HENT: Negative for congestion, ear pain, nosebleeds, rhinorrhea and sore throat  Eyes: Negative for photophobia, pain, redness and visual disturbance  Respiratory: Positive for cough (when running)  Negative for shortness of breath and wheezing  Cardiovascular: Negative  Negative for chest pain  Gastrointestinal: Negative    Negative for abdominal pain, constipation, diarrhea and vomiting  Endocrine: Negative  Genitourinary: Negative for difficulty urinating, dysuria and flank pain  Musculoskeletal: Negative  Skin: Negative  Negative for color change and rash  Neurological: Negative for dizziness, weakness, numbness and headaches  Hematological: Negative for adenopathy  Psychiatric/Behavioral: Negative for agitation and confusion  The patient is not nervous/anxious  Past Medical History:     Past Medical History:   Diagnosis Date    Hyperlipidemia       Past Surgical History:     Past Surgical History:   Procedure Laterality Date    HAND EXTENSOR TENDON EXCISION      KNEE ARTHROSCOPY        Family History:     No family history on file     Social History:     Social History     Socioeconomic History    Marital status: Single     Spouse name: None    Number of children: None    Years of education: None    Highest education level: None   Occupational History    None   Tobacco Use    Smoking status: Never    Smokeless tobacco: Never   Substance and Sexual Activity    Alcohol use: Never     Comment: reports socially drinks alcohol    Drug use: Never    Sexual activity: None   Other Topics Concern    None   Social History Narrative    None     Social Determinants of Health     Financial Resource Strain: Not on file   Food Insecurity: Not on file   Transportation Needs: Not on file   Physical Activity: Not on file   Stress: Not on file   Social Connections: Not on file   Intimate Partner Violence: Not on file   Housing Stability: Not on file      Current Medications:     Current Outpatient Medications   Medication Sig Dispense Refill    famotidine (PEPCID) 40 MG tablet Take 40 mg by mouth daily      lisinopril (ZESTRIL) 10 mg tablet Take 1 tablet (10 mg total) by mouth daily 30 tablet 3    pantoprazole (PROTONIX) 40 mg tablet Take 1 tablet (40 mg total) by mouth daily 90 tablet 3     No current facility-administered "medications for this visit  Allergies:     No Known Allergies   Physical Exam:     /100   Pulse 70   Temp 97 8 °F (36 6 °C)   Ht 5' 9\" (1 753 m)   Wt 101 kg (222 lb)   SpO2 96%   BMI 32 78 kg/m²     Physical Exam  Vitals and nursing note reviewed  Constitutional:       General: He is not in acute distress  Appearance: He is well-developed  He is not diaphoretic  Cardiovascular:      Rate and Rhythm: Normal rate and regular rhythm  Heart sounds: Normal heart sounds  Pulmonary:      Effort: Pulmonary effort is normal  No respiratory distress  Breath sounds: Normal breath sounds  Abdominal:      Palpations: Abdomen is soft  Musculoskeletal:         General: Normal range of motion  Skin:     General: Skin is warm and dry  Capillary Refill: Capillary refill takes less than 2 seconds  Findings: No erythema or rash  Neurological:      Mental Status: He is alert  Psychiatric:         Behavior: Behavior normal  Behavior is cooperative  Thought Content:  Thought content normal           Claude Pouch, CRNP  [facility]  "

## 2023-05-10 ENCOUNTER — APPOINTMENT (OUTPATIENT)
Dept: LAB | Facility: CLINIC | Age: 48
End: 2023-05-10

## 2023-05-10 DIAGNOSIS — Z00.00 ANNUAL PHYSICAL EXAM: ICD-10-CM

## 2023-05-10 DIAGNOSIS — E78.2 MIXED HYPERLIPIDEMIA: ICD-10-CM

## 2023-05-10 DIAGNOSIS — Z83.49 FAMILY HISTORY OF THYROID DISEASE IN FATHER: ICD-10-CM

## 2023-05-10 LAB
ALBUMIN SERPL BCP-MCNC: 4.5 G/DL (ref 3.5–5)
ALP SERPL-CCNC: 50 U/L (ref 46–116)
ALT SERPL W P-5'-P-CCNC: 69 U/L (ref 12–78)
ANION GAP SERPL CALCULATED.3IONS-SCNC: -1 MMOL/L (ref 4–13)
AST SERPL W P-5'-P-CCNC: 24 U/L (ref 5–45)
BILIRUB SERPL-MCNC: 0.77 MG/DL (ref 0.2–1)
BUN SERPL-MCNC: 28 MG/DL (ref 5–25)
CALCIUM SERPL-MCNC: 9.5 MG/DL (ref 8.3–10.1)
CHLORIDE SERPL-SCNC: 107 MMOL/L (ref 96–108)
CHOLEST SERPL-MCNC: 209 MG/DL
CO2 SERPL-SCNC: 29 MMOL/L (ref 21–32)
CREAT SERPL-MCNC: 1.03 MG/DL (ref 0.6–1.3)
GFR SERPL CREATININE-BSD FRML MDRD: 84 ML/MIN/1.73SQ M
GLUCOSE P FAST SERPL-MCNC: 95 MG/DL (ref 65–99)
HDLC SERPL-MCNC: 54 MG/DL
LDLC SERPL CALC-MCNC: 120 MG/DL (ref 0–100)
NONHDLC SERPL-MCNC: 155 MG/DL
POTASSIUM SERPL-SCNC: 4.2 MMOL/L (ref 3.5–5.3)
PROT SERPL-MCNC: 7.8 G/DL (ref 6.4–8.4)
SODIUM SERPL-SCNC: 135 MMOL/L (ref 135–147)
TRIGL SERPL-MCNC: 174 MG/DL
TSH SERPL DL<=0.05 MIU/L-ACNC: 1.32 UIU/ML (ref 0.45–4.5)

## 2023-06-09 ENCOUNTER — OFFICE VISIT (OUTPATIENT)
Dept: FAMILY MEDICINE CLINIC | Facility: CLINIC | Age: 48
End: 2023-06-09
Payer: COMMERCIAL

## 2023-06-09 VITALS
TEMPERATURE: 98.6 F | SYSTOLIC BLOOD PRESSURE: 138 MMHG | HEIGHT: 69 IN | HEART RATE: 79 BPM | WEIGHT: 218 LBS | DIASTOLIC BLOOD PRESSURE: 92 MMHG | BODY MASS INDEX: 32.29 KG/M2 | OXYGEN SATURATION: 97 %

## 2023-06-09 DIAGNOSIS — E78.00 ELEVATED CHOLESTEROL: Primary | ICD-10-CM

## 2023-06-09 DIAGNOSIS — I10 PRIMARY HYPERTENSION: ICD-10-CM

## 2023-06-09 PROCEDURE — 99213 OFFICE O/P EST LOW 20 MIN: CPT | Performed by: NURSE PRACTITIONER

## 2023-06-09 RX ORDER — LISINOPRIL 20 MG/1
20 TABLET ORAL DAILY
Qty: 90 TABLET | Refills: 3 | Status: SHIPPED | OUTPATIENT
Start: 2023-06-09

## 2023-06-09 NOTE — PROGRESS NOTES
[facility]        NAME: Katelyn Rosas is a 51 y o  male  : 1975    MRN: [id number]  DATE: 2023  TIME: 1:34 PM    Assessment and Plan   Elevated cholesterol [E78 00]  1  Elevated cholesterol  Lipid panel      2  Primary hypertension  lisinopril (ZESTRIL) 20 mg tablet    Basic metabolic panel    Lipid panel        1  Primary hypertension   increase to 20mg  Follow up as scheduled in 6 months  Nurse visit BP check in 4 weeks  - lisinopril (ZESTRIL) 20 mg tablet; Take 1 tablet (20 mg total) by mouth daily  Dispense: 90 tablet; Refill: 3      Patient Instructions     There are no Patient Instructions on file for this visit  Chief Complaint     Chief Complaint   Patient presents with   • Follow-up     F/u HTN          History of Present Illness        Patient here for follow up visit for HTN  Started lisinopril 10mg 1month ago  Denies any side effects  Taking medication daily  BP borderline  Trying to watch salt in his diet  Denies HA, dizziness, lightheaedness or chest pain  Review of Systems   Review of Systems   Constitutional: Negative for fatigue and fever  Respiratory: Negative  Negative for shortness of breath and wheezing  Cardiovascular: Negative  Negative for chest pain and palpitations  Musculoskeletal: Negative  Negative for arthralgias and myalgias  Skin: Negative  Negative for rash  Neurological: Negative  Negative for facial asymmetry and light-headedness  Psychiatric/Behavioral: Negative          PHQ-2/9 Depression Screening    Little interest or pleasure in doing things: 0 - not at all  Feeling down, depressed, or hopeless: 0 - not at all  PHQ-2 Score: 0  PHQ-2 Interpretation: Negative depression screen        Current Medications       Current Outpatient Medications:   •  famotidine (PEPCID) 40 MG tablet, Take 40 mg by mouth daily, Disp: , Rfl:   •  lisinopril (ZESTRIL) 20 mg tablet, Take 1 tablet (20 mg total) by mouth daily, "Disp: 90 tablet, Rfl: 3  •  pantoprazole (PROTONIX) 40 mg tablet, Take 1 tablet (40 mg total) by mouth daily, Disp: 90 tablet, Rfl: 3    Current Allergies     Allergies as of 06/09/2023   • (No Known Allergies)            The following portions of the patient's history were reviewed and updated as appropriate: allergies, current medications, past family history, past medical history, past social history, past surgical history and problem list      Past Medical History:   Diagnosis Date   • Hyperlipidemia        Past Surgical History:   Procedure Laterality Date   • HAND EXTENSOR TENDON EXCISION     • KNEE ARTHROSCOPY         No family history on file  Medications have been verified  Objective   /92   Pulse 79   Temp 98 6 °F (37 °C)   Ht 5' 9\" (1 753 m)   Wt 98 9 kg (218 lb)   SpO2 97%   BMI 32 19 kg/m²        Physical Exam     Physical Exam  Vitals and nursing note reviewed  Constitutional:       General: He is not in acute distress  Appearance: Normal appearance  He is well-developed  He is not ill-appearing  HENT:      Head: Normocephalic and atraumatic  Eyes:      General: Lids are normal    Cardiovascular:      Rate and Rhythm: Normal rate and regular rhythm  Heart sounds: Normal heart sounds, S1 normal and S2 normal  No murmur heard  Pulmonary:      Effort: Pulmonary effort is normal  No respiratory distress  Breath sounds: Normal breath sounds  No decreased breath sounds or wheezing  Musculoskeletal:         General: No tenderness or deformity  Normal range of motion  Skin:     General: Skin is warm  Findings: No erythema or rash  Neurological:      General: No focal deficit present  Mental Status: He is alert and oriented to person, place, and time  Psychiatric:         Behavior: Behavior normal  Behavior is cooperative  Thought Content:  Thought content normal              "

## 2023-07-07 ENCOUNTER — TELEPHONE (OUTPATIENT)
Dept: FAMILY MEDICINE CLINIC | Facility: CLINIC | Age: 48
End: 2023-07-07

## 2023-07-07 ENCOUNTER — CLINICAL SUPPORT (OUTPATIENT)
Dept: FAMILY MEDICINE CLINIC | Facility: CLINIC | Age: 48
End: 2023-07-07
Payer: COMMERCIAL

## 2023-07-07 VITALS — SYSTOLIC BLOOD PRESSURE: 146 MMHG | OXYGEN SATURATION: 99 % | DIASTOLIC BLOOD PRESSURE: 82 MMHG | HEART RATE: 76 BPM

## 2023-07-07 DIAGNOSIS — I10 PRIMARY HYPERTENSION: Primary | ICD-10-CM

## 2023-07-07 PROCEDURE — 99211 OFF/OP EST MAY X REQ PHY/QHP: CPT

## 2023-07-07 NOTE — TELEPHONE ENCOUNTER
[facility] and [facility] for patient to please give office a call back

## 2023-07-07 NOTE — TELEPHONE ENCOUNTER
Patient presented for BP check and stated he feels fine, is taking the updated medications as instructed, would like to use the [facility] in [place] if medications are changed he stated that he will have labs done next week sometime,     Left arm  Sitting  146/82  77  99%

## 2023-07-07 NOTE — TELEPHONE ENCOUNTER
146/82 is still high, if he is taking Lisinopril 20mg daily, ask him to take 30mg daily- we can either send in a new RX for 30mg tablets or he can take 1 1/2 tablets. Please update his medlist if he is taking 1 1/2. Thanks!

## 2023-08-24 ENCOUNTER — CONSULT (OUTPATIENT)
Dept: GASTROENTEROLOGY | Facility: CLINIC | Age: 48
End: 2023-08-24
Payer: COMMERCIAL

## 2023-08-24 VITALS
HEIGHT: 69 IN | DIASTOLIC BLOOD PRESSURE: 82 MMHG | HEART RATE: 97 BPM | RESPIRATION RATE: 16 BRPM | SYSTOLIC BLOOD PRESSURE: 142 MMHG | WEIGHT: 214.4 LBS | BODY MASS INDEX: 31.76 KG/M2 | OXYGEN SATURATION: 76 % | TEMPERATURE: 97.5 F

## 2023-08-24 DIAGNOSIS — K76.0 FATTY LIVER: ICD-10-CM

## 2023-08-24 DIAGNOSIS — I10 PRIMARY HYPERTENSION: ICD-10-CM

## 2023-08-24 DIAGNOSIS — Z12.12 ENCOUNTER FOR COLORECTAL CANCER SCREENING: ICD-10-CM

## 2023-08-24 DIAGNOSIS — Z12.11 ENCOUNTER FOR COLORECTAL CANCER SCREENING: ICD-10-CM

## 2023-08-24 DIAGNOSIS — R06.09 DOE (DYSPNEA ON EXERTION): ICD-10-CM

## 2023-08-24 DIAGNOSIS — Z12.11 COLON CANCER SCREENING: ICD-10-CM

## 2023-08-24 DIAGNOSIS — K21.9 GASTROESOPHAGEAL REFLUX DISEASE WITHOUT ESOPHAGITIS: Primary | ICD-10-CM

## 2023-08-24 PROCEDURE — 99244 OFF/OP CNSLTJ NEW/EST MOD 40: CPT | Performed by: INTERNAL MEDICINE

## 2023-08-24 RX ORDER — FAMOTIDINE 40 MG/1
40 TABLET, FILM COATED ORAL
Qty: 90 TABLET | Refills: 3 | Status: SHIPPED | OUTPATIENT
Start: 2023-08-24

## 2023-08-24 RX ORDER — OMEPRAZOLE 40 MG/1
40 CAPSULE, DELAYED RELEASE ORAL DAILY
Qty: 90 CAPSULE | Refills: 1 | Status: SHIPPED | OUTPATIENT
Start: 2023-08-24

## 2023-08-24 RX ORDER — NAPROXEN 500 MG/1
1 TABLET ORAL 2 TIMES DAILY WITH MEALS
COMMUNITY
End: 2023-08-24 | Stop reason: ALTCHOICE

## 2023-08-24 RX ORDER — POLYETHYLENE GLYCOL 3350, SODIUM CHLORIDE, SODIUM BICARBONATE, POTASSIUM CHLORIDE 420; 11.2; 5.72; 1.48 G/4L; G/4L; G/4L; G/4L
4000 POWDER, FOR SOLUTION ORAL ONCE
Qty: 4000 ML | Refills: 0 | Status: SHIPPED | OUTPATIENT
Start: 2023-08-24 | End: 2023-08-24

## 2023-08-24 NOTE — ASSESSMENT & PLAN NOTE
Patient was noted to have fatty liver on imaging test.  In addition his ALT is at the upper limit of what is considered normal for that lab however that limit is very high. For men typically upper limbs ALT is about 40. Fatty liver is likely secondary to his elevated body mass index of 31.6 in addition to hyperlipidemia. Primary treatment at this point time is weight reduction and tight control of hyperlipidemia. He should discuss with his primary care provider treatment for his high cholesterol and some dietary recommendations. Goal would be to lose about 7 to 10% of his body weight over the next 12 months. For completeness check hepatitis a and B serologies. He is not immune consider vaccination. Check iron studies  Repeat liver profile  Check CBC  Check ultrasound elastography to evaluate for any liver scarring.

## 2023-08-24 NOTE — ASSESSMENT & PLAN NOTE
Started with coughing when he would lie down after eating about 18 months ago. In addition he was coughing and having shortness of breath with exertion of any type. Initially felt his symptoms got better with acid suppressants and therefore he believes this may be reflux related. His symptoms are somewhat atypical.  I have recommended pulmonary consultation given his dyspnea upon exertion and atypical symptoms. I will change his pantoprazole to omeprazole 40 mg daily. Best to take this 30 minutes to 1 hour before 1 meal a day. Continue famotidine 2 hours before bed. Lifestyle modifications for gastroesophageal reflux disease were discussed and include limiting fried and fatty foods, mints, chocolates, carbonated and caffeinated beverages , and alcohol, etc.  Avoid lying down for 2-3 hours after meals. If you have nighttime symptoms consider raising the head of the bed up on 4-6 inch blocks. Pillows typically are not useful. If you are overweight, weight loss will be helpful. Given atypical symptoms he will be scheduled for upper endoscopy. I explained to the patient the procedure of upper endoscopy as well as its potential risk which are approximately 1 in 1000 chance of bleeding, infection, and perforation.

## 2023-08-24 NOTE — ASSESSMENT & PLAN NOTE
Patient reports cough and dyspnea upon exertion with going out for a run or any significant exertion. He thinks maybe the symptoms got better to some degree with acid suppressants but is not completely resolved. Based upon this I am referring him to pulmonary medicine.

## 2023-08-24 NOTE — ASSESSMENT & PLAN NOTE
Tate Lemus is aware of various colon cancer screening options. He is aware of stool based test such as Cologuard and FIT testing. He would prefer to pursue a colonoscopy. He will receive a Colyte preparation split dose with the second dose completed 3 hours prior to arrival.  2 bisacodyl tablets. Adult scope  I explained to the patient the procedure of colonoscopy as well as its potential risks which are approximately 3 in 1000 chance of bleeding, infection, and perforation. Perforation may require a surgeon to repair it. I also explained the small but significant chance of missing lesions with colonoscopy which has been reported to be about 5-10%.

## 2023-08-24 NOTE — PATIENT INSTRUCTIONS
GERD (gastroesophageal reflux disease)  Started with coughing when he would lie down after eating about 18 months ago. In addition he was coughing and having shortness of breath with exertion of any type. Initially felt his symptoms got better with acid suppressants and therefore he believes this may be reflux related. His symptoms are somewhat atypical.  I have recommended pulmonary consultation given his dyspnea upon exertion and atypical symptoms. I will change his pantoprazole to omeprazole 40 mg daily. Best to take this 30 minutes to 1 hour before 1 meal a day. Continue famotidine 2 hours before bed. Lifestyle modifications for gastroesophageal reflux disease were discussed and include limiting fried and fatty foods, mints, chocolates, carbonated and caffeinated beverages , and alcohol, etc.  Avoid lying down for 2-3 hours after meals. If you have nighttime symptoms consider raising the head of the bed up on 4-6 inch blocks. Pillows typically are not useful. If you are overweight, weight loss will be helpful. Given atypical symptoms he will be scheduled for upper endoscopy. I explained to the patient the procedure of upper endoscopy as well as its potential risk which are approximately 1 in 1000 chance of bleeding, infection, and perforation. Encounter for colorectal cancer screening  Elisa Baker is aware of various colon cancer screening options. He is aware of stool based test such as Cologuard and FIT testing. He would prefer to pursue a colonoscopy. He will receive a Colyte preparation split dose with the second dose completed 3 hours prior to arrival.  2 bisacodyl tablets. Adult scope  I explained to the patient the procedure of colonoscopy as well as its potential risks which are approximately 3 in 1000 chance of bleeding, infection, and perforation. Perforation may require a surgeon to repair it.   I also explained the small but significant chance of missing lesions with colonoscopy which has been reported to be about 5-10%. NASCIMENTO (dyspnea on exertion)  Patient reports cough and dyspnea upon exertion with going out for a run or any significant exertion. He thinks maybe the symptoms got better to some degree with acid suppressants but is not completely resolved. Based upon this I am referring him to pulmonary medicine. Fatty liver  Patient was noted to have fatty liver on imaging test.  In addition his ALT is at the upper limit of what is considered normal for that lab however that limit is very high. For men typically upper limbs ALT is about 40. Fatty liver is likely secondary to his elevated body mass index of 31.6 in addition to hyperlipidemia. Primary treatment at this point time is weight reduction and tight control of hyperlipidemia. He should discuss with his primary care provider treatment for his high cholesterol and some dietary recommendations. Goal would be to lose about 7 to 10% of his body weight over the next 12 months. For completeness check hepatitis a and B serologies. He is not immune consider vaccination. Check iron studies  Repeat liver profile  Check CBC  Check ultrasound elastography to evaluate for any liver scarring.

## 2023-08-24 NOTE — PROGRESS NOTES
[facility]  Gastroenterology Outpatient Follow up  Patient Bennett Huerta   Age 51 y.o. Gender male   MRN: [id number]  [facility] [id number]     ASSESSMENT AND PLAN:   Problem List Items Addressed This Visit        Digestive    GERD (gastroesophageal reflux disease) - Primary     Started with coughing when he would lie down after eating about 18 months ago. In addition he was coughing and having shortness of breath with exertion of any type. Initially felt his symptoms got better with acid suppressants and therefore he believes this may be reflux related. His symptoms are somewhat atypical.  I have recommended pulmonary consultation given his dyspnea upon exertion and atypical symptoms. I will change his pantoprazole to omeprazole 40 mg daily. Best to take this 30 minutes to 1 hour before 1 meal a day. Continue famotidine 2 hours before bed. Lifestyle modifications for gastroesophageal reflux disease were discussed and include limiting fried and fatty foods, mints, chocolates, carbonated and caffeinated beverages , and alcohol, etc.  Avoid lying down for 2-3 hours after meals. If you have nighttime symptoms consider raising the head of the bed up on 4-6 inch blocks. Pillows typically are not useful. If you are overweight, weight loss will be helpful. Given atypical symptoms he will be scheduled for upper endoscopy. I explained to the patient the procedure of upper endoscopy as well as its potential risk which are approximately 1 in 1000 chance of bleeding, infection, and perforation. Relevant Medications    omeprazole (PriLOSEC) 40 MG capsule    famotidine (PEPCID) 40 MG tablet    Other Relevant Orders    EGD    Fatty liver     Patient was noted to have fatty liver on imaging test.  In addition his ALT is at the upper limit of what is considered normal for that lab however that limit is very high. For men typically upper limbs ALT is about 40.   Fatty liver is likely secondary to his elevated body mass index of 31.6 in addition to hyperlipidemia. Primary treatment at this point time is weight reduction and tight control of hyperlipidemia. He should discuss with his primary care provider treatment for his high cholesterol and some dietary recommendations. Goal would be to lose about 7 to 10% of his body weight over the next 12 months. For completeness check hepatitis a and B serologies. He is not immune consider vaccination. Check iron studies  Repeat liver profile  Check CBC  Check ultrasound elastography to evaluate for any liver scarring. Relevant Orders    Hepatitis A antibody, total    Hepatitis B core antibody, total    Hepatitis B surface antigen    Hepatitis B surface antibody    US elastography/UGAP    CBC    Hepatic function panel    Iron Panel (Includes Ferritin, Iron Sat%, Iron, and TIBC)       Other    Encounter for colorectal cancer screening     Tate Lemus is aware of various colon cancer screening options. He is aware of stool based test such as Cologuard and FIT testing. He would prefer to pursue a colonoscopy. He will receive a Colyte preparation split dose with the second dose completed 3 hours prior to arrival.  2 bisacodyl tablets. Adult scope  I explained to the patient the procedure of colonoscopy as well as its potential risks which are approximately 3 in 1000 chance of bleeding, infection, and perforation. Perforation may require a surgeon to repair it. I also explained the small but significant chance of missing lesions with colonoscopy which has been reported to be about 5-10%. Relevant Medications    polyethylene glycol-electrolytes (TriLyte) 4000 mL solution    Other Relevant Orders    Colonoscopy    NASCIMENTO (dyspnea on exertion)     Patient reports cough and dyspnea upon exertion with going out for a run or any significant exertion.   He thinks maybe the symptoms got better to some degree with acid suppressants but is not completely resolved. Based upon this I am referring him to pulmonary medicine. Relevant Orders    Ambulatory Referral to Pulmonology   Other Visit Diagnoses     Colon cancer screening             _____________________________________________________________    HPI:   Giorgio Do is a delightful 48-year-old gentleman home seen in the office in consultation for possible Gastrosoft reflux disease and colon cancer screening. He reports some first thinking he had reflux about 18 months ago. He noticed that when he would lie down after eating he was coughing a lot. He also was coughing a lot when he would go for a run or exert himself. He picked up some form of acid lowering medication over-the-counter initially that seemed to help him but the effect wore off and he started coughing again. He saw his primary care provider and was placed on pantoprazole 40 mg daily this also seemed to help him initially but the effects wore off and he started coughing again when he would lie down or if he would go running or exert himself. Famotidine over-the-counter 20 mg tablets was then recommended and he has been doing 2 tablets at bedtime. Despite this he still having dyspnea upon exertion inability to catch his breath when he is exerting himself etc.  Once again this symptom has been attributed to reflux. He does not have any classic heartburn symptoms. Denies any nausea vomiting or early satiety. He does not recall having nocturnal regurgitation episodes. He has been on lisinopril only for the last couple months. He was not on blood pressure medication when his cough started    He takes 600 mg of ibuprofen twice a week. His bowel habits have been very regular. Denies any change in his bowel pattern. Denies any diarrhea, constipation, bleeding or black stools. He denies any abdominal pain. He denies any family history colon cancer colon polyps.     He does not smoke tobacco.  He may have an occasional alcoholic beverage. 5/10/2023 laboratory data  BUN was 28 creatinine 1.03  AST was 24 ALT 69  Albumin 4.5  T. bili 0.77  Cholesterol was 209  Triglycerides 174  HDL 54    Last CBC was 4/10/2022 and was normal  TSH 1.32  Hep C antibody nonreactive    8/15/2018 CT scan of the abdomen pelvis done for renal stone study  Faint tree-in-bud opacities at the base of the right middle lobe. Hepatic steatosis  Spleen, pancreas, adrenal glands and kidneys are suboptimally evaluated on a nonenhanced image but demonstrate no acute pathology. 4 mm stone left ureter and mild left hydronephrosis  Small fat-containing umbilical hernia      No Known Allergies  Current Outpatient Medications   Medication Sig Dispense Refill   • famotidine (PEPCID) 40 MG tablet Take 1 tablet (40 mg total) by mouth daily at bedtime Take in evening 2 hours prior to bed 90 tablet 3   • lisinopril (ZESTRIL) 20 mg tablet Take 1 tablet (20 mg total) by mouth daily 90 tablet 3   • omeprazole (PriLOSEC) 40 MG capsule Take 1 capsule (40 mg total) by mouth daily Take 30 minutes to one hour before one meal a day 90 capsule 1   • polyethylene glycol-electrolytes (TriLyte) 4000 mL solution Take 4,000 mL by mouth once for 1 dose Take 4000 mL by mouth once for 1 dose. Use as directed 4000 mL 0     No current facility-administered medications for this visit. MEDICAL HISTORY:  Past Medical History:   Diagnosis Date   • GERD (gastroesophageal reflux disease) Years ago    Has gotten worse last few months   • Hyperlipidemia      Past Surgical History:   Procedure Laterality Date   • HAND EXTENSOR TENDON EXCISION     • KNEE ARTHROSCOPY       Social History     Substance and Sexual Activity   Alcohol Use Never    Comment: reports socially drinks alcohol     Social History     Substance and Sexual Activity   Drug Use Never     Social History     Tobacco Use   Smoking Status Never   Smokeless Tobacco Never     History reviewed. No pertinent family history.     REVIEW OF SYSTEMS:  CONSTITUTIONAL: Denies any fever, chills, rigors, and weight loss. HEENT: No earache or tinnitus. Denies hearing loss or visual disturbances. CARDIOVASCULAR: No chest pain or palpitations. RESPIRATORY: He does report dyspnea upon exertion and cough as mentioned above. Denies any wheezing. GASTROINTESTINAL: As noted in the History of Present Illness. GENITOURINARY: No problems with urination. Denies any hematuria or dysuria. NEUROLOGIC: No dizziness or vertigo, denies headaches. MUSCULOSKELETAL: Denies any muscle or joint pain. Does report muscle pain for which she will take ibuprofen. SKIN: Denies skin rashes or itching. ENDOCRINE: Denies excessive thirst. Denies intolerance to heat or cold. PSYCHOSOCIAL: Denies depression or anxiety. Denies any recent memory loss. Objective   Blood pressure 142/82, pulse 97, temperature 97.5 °F (36.4 °C), temperature source Temporal, resp. rate 16, height 5' 9" (1.753 m), weight 97.3 kg (214 lb 6.4 oz), SpO2 (!) 76 %. Body mass index is 31.66 kg/m². PHYSICAL EXAM:   General Appearance: Alert, cooperative, no distress  HEENT: Normocephalic, atraumatic, anicteric. Neck: Supple, symmetrical, trachea midline  Lungs: Clear to auscultation bilaterally; no rales, rhonchi or wheezing; respirations unlabored   Heart: Regular rate and rhythm; no murmur, rub, or gallop. Abdomen: Soft, bowel sounds normal, non-tender, non-distended; no masses, there is no hepatosplenomegaly. No spider angiomas  Genitalia: Deferred   Rectal: Deferred   Extremities: No cyanosis, clubbing or edema   Skin: No jaundice, rashes, or lesions   Lymph nodes: No palpable cervical lymphadenopathy   Lab Results:   No visits with results within 2 Month(s) from this visit.    Latest known visit with results is:   Appointment on 05/10/2023   Component Date Value   • Cholesterol 05/10/2023 209 (H)    • Triglycerides 05/10/2023 174 (H)    • HDL, Direct 05/10/2023 54    • LDL Calculated 05/10/2023 120 (H)    • Non-HDL-Chol (CHOL-HDL) 05/10/2023 155    • Sodium 05/10/2023 135    • Potassium 05/10/2023 4.2    • Chloride 05/10/2023 107    • CO2 05/10/2023 29    • ANION GAP 05/10/2023 -1 (L)    • BUN 05/10/2023 28 (H)    • Creatinine 05/10/2023 1.03    • Glucose, Fasting 05/10/2023 95    • Calcium 05/10/2023 9.5    • AST 05/10/2023 24    • ALT 05/10/2023 69    • Alkaline Phosphatase 05/10/2023 50    • Total Protein 05/10/2023 7.8    • Albumin 05/10/2023 4.5    • Total Bilirubin 05/10/2023 0.77    • eGFR 05/10/2023 84    • TSH 3RD GENERATON 05/10/2023 1.320      Radiology Results:   No results found.   [address] Rivera DO   08/24/23   Cc:

## 2023-08-25 DIAGNOSIS — I10 PRIMARY HYPERTENSION: Primary | ICD-10-CM

## 2023-08-25 RX ORDER — LISINOPRIL 30 MG/1
30 TABLET ORAL DAILY
Qty: 90 TABLET | Refills: 3 | Status: SHIPPED | OUTPATIENT
Start: 2023-08-25

## 2023-08-25 RX ORDER — LISINOPRIL 20 MG/1
20 TABLET ORAL DAILY
Qty: 90 TABLET | Refills: 0 | Status: SHIPPED | OUTPATIENT
Start: 2023-08-25 | End: 2023-08-25

## 2023-08-31 ENCOUNTER — HOSPITAL ENCOUNTER (OUTPATIENT)
Dept: ULTRASOUND IMAGING | Facility: HOSPITAL | Age: 48
End: 2023-08-31
Attending: INTERNAL MEDICINE
Payer: COMMERCIAL

## 2023-08-31 DIAGNOSIS — K76.0 FATTY LIVER: ICD-10-CM

## 2023-08-31 PROCEDURE — 76981 USE PARENCHYMA: CPT

## 2023-09-08 NOTE — RESULT ENCOUNTER NOTE
Please inform patient that the ultrasound elastography revealed good results, F0 to F1 which means absent to mild fibrosis (scarring). This is a very good result and reassuring. There is evidence of fatty liver noted. Primary treatment at this point in time is weight reduction. In addition if he does have high triglycerides and high cholesterol treatment of this would also be helpful for fatty liver.   Thank you

## 2023-09-21 ENCOUNTER — LAB (OUTPATIENT)
Dept: LAB | Facility: CLINIC | Age: 48
End: 2023-09-21
Payer: COMMERCIAL

## 2023-09-21 DIAGNOSIS — E78.00 ELEVATED CHOLESTEROL: ICD-10-CM

## 2023-09-21 DIAGNOSIS — K76.0 FATTY LIVER: ICD-10-CM

## 2023-09-21 DIAGNOSIS — I10 PRIMARY HYPERTENSION: ICD-10-CM

## 2023-09-21 LAB
ALBUMIN SERPL BCP-MCNC: 4.9 G/DL (ref 3.5–5)
ALP SERPL-CCNC: 51 U/L (ref 34–104)
ALT SERPL W P-5'-P-CCNC: 58 U/L (ref 7–52)
ANION GAP SERPL CALCULATED.3IONS-SCNC: 12 MMOL/L
AST SERPL W P-5'-P-CCNC: 28 U/L (ref 13–39)
BILIRUB DIRECT SERPL-MCNC: 0.06 MG/DL (ref 0–0.2)
BILIRUB SERPL-MCNC: 0.68 MG/DL (ref 0.2–1)
BUN SERPL-MCNC: 36 MG/DL (ref 5–25)
CALCIUM SERPL-MCNC: 9.7 MG/DL (ref 8.4–10.2)
CHLORIDE SERPL-SCNC: 102 MMOL/L (ref 96–108)
CHOLEST SERPL-MCNC: 236 MG/DL
CO2 SERPL-SCNC: 24 MMOL/L (ref 21–32)
CREAT SERPL-MCNC: 1.06 MG/DL (ref 0.6–1.3)
ERYTHROCYTE [DISTWIDTH] IN BLOOD BY AUTOMATED COUNT: 12.3 % (ref 11.6–15.1)
FERRITIN SERPL-MCNC: 385 NG/ML (ref 24–336)
GFR SERPL CREATININE-BSD FRML MDRD: 82 ML/MIN/1.73SQ M
GLUCOSE P FAST SERPL-MCNC: 91 MG/DL (ref 65–99)
HAV AB SER QL IA: REACTIVE
HBV CORE AB SER QL: NORMAL
HBV SURFACE AB SER-ACNC: <3 MIU/ML
HBV SURFACE AG SER QL: NORMAL
HCT VFR BLD AUTO: 47.4 % (ref 36.5–49.3)
HDLC SERPL-MCNC: 49 MG/DL
HGB BLD-MCNC: 16.5 G/DL (ref 12–17)
IRON SATN MFR SERPL: 33 % (ref 15–50)
IRON SERPL-MCNC: 96 UG/DL (ref 50–212)
LDLC SERPL CALC-MCNC: 129 MG/DL (ref 0–100)
MCH RBC QN AUTO: 31.1 PG (ref 26.8–34.3)
MCHC RBC AUTO-ENTMCNC: 34.8 G/DL (ref 31.4–37.4)
MCV RBC AUTO: 89 FL (ref 82–98)
NONHDLC SERPL-MCNC: 187 MG/DL
PLATELET # BLD AUTO: 276 THOUSANDS/UL (ref 149–390)
PMV BLD AUTO: 10.5 FL (ref 8.9–12.7)
POTASSIUM SERPL-SCNC: 4.4 MMOL/L (ref 3.5–5.3)
PROT SERPL-MCNC: 7 G/DL (ref 6.4–8.4)
RBC # BLD AUTO: 5.31 MILLION/UL (ref 3.88–5.62)
SODIUM SERPL-SCNC: 138 MMOL/L (ref 135–147)
TIBC SERPL-MCNC: 294 UG/DL (ref 250–450)
TRIGL SERPL-MCNC: 288 MG/DL
UIBC SERPL-MCNC: 198 UG/DL (ref 155–355)
WBC # BLD AUTO: 5.6 THOUSAND/UL (ref 4.31–10.16)

## 2023-09-21 PROCEDURE — 86708 HEPATITIS A ANTIBODY: CPT

## 2023-09-21 PROCEDURE — 85027 COMPLETE CBC AUTOMATED: CPT

## 2023-09-21 PROCEDURE — 82728 ASSAY OF FERRITIN: CPT

## 2023-09-21 PROCEDURE — 87340 HEPATITIS B SURFACE AG IA: CPT

## 2023-09-21 PROCEDURE — 83550 IRON BINDING TEST: CPT

## 2023-09-21 PROCEDURE — 80048 BASIC METABOLIC PNL TOTAL CA: CPT

## 2023-09-21 PROCEDURE — 80061 LIPID PANEL: CPT

## 2023-09-21 PROCEDURE — 86706 HEP B SURFACE ANTIBODY: CPT

## 2023-09-21 PROCEDURE — 86704 HEP B CORE ANTIBODY TOTAL: CPT

## 2023-09-21 PROCEDURE — 83540 ASSAY OF IRON: CPT

## 2023-09-21 PROCEDURE — 80076 HEPATIC FUNCTION PANEL: CPT

## 2023-09-21 PROCEDURE — 36415 COLL VENOUS BLD VENIPUNCTURE: CPT

## 2023-09-23 NOTE — RESULT ENCOUNTER NOTE
Liver enzymes revealed the ALT to be mildly elevated at 58, very similar to 4 months ago and 1 year ago. Please inform patient chemistry panel suggest mild dehydration with elevated BUN. This could be related to fasting for the laboratory test.  Lipid profile did reveal high cholesterol 236 high triglycerides at 288 high LDL at 129. He is immune to hepatitis A. Iron studies are normal.  He is not immune to hepatitis B and should therefore consider hepatitis B vaccination. CBC is okay. Proceed with ultrasound elastography. Primary treatment for fatty liver at this point in time is tight control of his high cholesterol/lipids and weight reduction.   Thank you

## 2023-09-27 ENCOUNTER — HOSPITAL ENCOUNTER (OUTPATIENT)
Dept: GASTROENTEROLOGY | Facility: HOSPITAL | Age: 48
Setting detail: OUTPATIENT SURGERY
Discharge: HOME | End: 2023-09-27
Attending: INTERNAL MEDICINE
Payer: COMMERCIAL

## 2023-09-27 ENCOUNTER — ANESTHESIA EVENT (OUTPATIENT)
Dept: GASTROENTEROLOGY | Facility: HOSPITAL | Age: 48
End: 2023-09-27

## 2023-09-27 ENCOUNTER — ANESTHESIA (OUTPATIENT)
Dept: GASTROENTEROLOGY | Facility: HOSPITAL | Age: 48
End: 2023-09-27

## 2023-09-27 VITALS
DIASTOLIC BLOOD PRESSURE: 66 MMHG | RESPIRATION RATE: 18 BRPM | OXYGEN SATURATION: 93 % | SYSTOLIC BLOOD PRESSURE: 116 MMHG | HEART RATE: 71 BPM | TEMPERATURE: 97.3 F

## 2023-09-27 DIAGNOSIS — K21.9 GASTROESOPHAGEAL REFLUX DISEASE WITHOUT ESOPHAGITIS: ICD-10-CM

## 2023-09-27 DIAGNOSIS — Z12.12 ENCOUNTER FOR COLORECTAL CANCER SCREENING: ICD-10-CM

## 2023-09-27 DIAGNOSIS — Z12.11 ENCOUNTER FOR COLORECTAL CANCER SCREENING: ICD-10-CM

## 2023-09-27 PROCEDURE — 88305 TISSUE EXAM BY PATHOLOGIST: CPT | Performed by: PATHOLOGY

## 2023-09-27 RX ORDER — SODIUM CHLORIDE, SODIUM LACTATE, POTASSIUM CHLORIDE, CALCIUM CHLORIDE 600; 310; 30; 20 MG/100ML; MG/100ML; MG/100ML; MG/100ML
125 INJECTION, SOLUTION INTRAVENOUS CONTINUOUS
Status: DISCONTINUED | OUTPATIENT
Start: 2023-09-27 | End: 2023-10-01 | Stop reason: HOSPADM

## 2023-09-27 RX ORDER — PROPOFOL 10 MG/ML
INJECTION, EMULSION INTRAVENOUS CONTINUOUS PRN
Status: DISCONTINUED | OUTPATIENT
Start: 2023-09-27 | End: 2023-09-27

## 2023-09-27 RX ORDER — LIDOCAINE HYDROCHLORIDE 20 MG/ML
INJECTION, SOLUTION EPIDURAL; INFILTRATION; INTRACAUDAL; PERINEURAL AS NEEDED
Status: DISCONTINUED | OUTPATIENT
Start: 2023-09-27 | End: 2023-09-27

## 2023-09-27 RX ORDER — PROPOFOL 10 MG/ML
INJECTION, EMULSION INTRAVENOUS AS NEEDED
Status: DISCONTINUED | OUTPATIENT
Start: 2023-09-27 | End: 2023-09-27

## 2023-09-27 RX ADMIN — PROPOFOL 200 MG: 10 INJECTION, EMULSION INTRAVENOUS at 12:11

## 2023-09-27 RX ADMIN — TOPICAL ANESTHETIC 2 SPRAY: 200 SPRAY DENTAL; PERIODONTAL at 12:08

## 2023-09-27 RX ADMIN — LIDOCAINE HYDROCHLORIDE 50 MG: 20 INJECTION, SOLUTION EPIDURAL; INFILTRATION; INTRACAUDAL; PERINEURAL at 12:16

## 2023-09-27 RX ADMIN — PROPOFOL 140 MCG/KG/MIN: 10 INJECTION, EMULSION INTRAVENOUS at 12:11

## 2023-09-27 RX ADMIN — SODIUM CHLORIDE, SODIUM LACTATE, POTASSIUM CHLORIDE, AND CALCIUM CHLORIDE 125 ML/HR: .6; .31; .03; .02 INJECTION, SOLUTION INTRAVENOUS at 10:01

## 2023-09-27 RX ADMIN — PROPOFOL 50 MG: 10 INJECTION, EMULSION INTRAVENOUS at 12:16

## 2023-09-27 RX ADMIN — LIDOCAINE HYDROCHLORIDE 100 MG: 20 INJECTION, SOLUTION EPIDURAL; INFILTRATION; INTRACAUDAL; PERINEURAL at 12:11

## 2023-09-27 NOTE — H&P
History and Physical - [facility]  Ariela Guzmán 51 y.o. male MRN: [id number]                  HPI: Ariela Guzmán is a 51y.o. year old male who presents for EGD and colonoscopy. Please refer to office note dated August 24, 2023. Patient believes he has GERD but his symptoms are very atypical.  He has shortness of breath with lying down and shortness of breath with exertion. He does not appointment see pulmonary near future. He also has failed fatty liver and dyspnea upon exertion. September 21 his ALT was 58 AST 28 cholesterol 236 triglycerides 188 ferritin 385 CBC was okay and platelet count was from 76,000. Ultrasound elastography revealed F0 to F1 and S3. He is immune to hepatitis A but is not immune to hepatitis B. His bowel habits been fairly regular. Denies any diarrhea constipation bleeding or black stools. He does still report dyspnea upon exertion. Not having shortness of breath with lying down is much as he had been previously. Denies any family history colon cancer colon polyps. He rarely drinks alcohol. He does not smoke tobacco.          REVIEW OF SYSTEMS: Per the HPI, and otherwise unremarkable. Historical Information   Past Medical History:   Diagnosis Date   • GERD (gastroesophageal reflux disease) Years ago    Has gotten worse last few months   • Hyperlipidemia      Past Surgical History:   Procedure Laterality Date   • HAND EXTENSOR TENDON EXCISION     • KNEE ARTHROSCOPY       Social History   Social History     Substance and Sexual Activity   Alcohol Use Never    Comment: reports socially drinks alcohol     Social History     Substance and Sexual Activity   Drug Use Never     Social History     Tobacco Use   Smoking Status Never   Smokeless Tobacco Never     History reviewed. No pertinent family history.     Meds/Allergies       Current Outpatient Medications:   •  famotidine (PEPCID) 40 MG tablet  •  lisinopril (ZESTRIL) 30 mg tablet  • omeprazole (PriLOSEC) 40 MG capsule  •  polyethylene glycol-electrolytes (TriLyte) 4000 mL solution    Current Facility-Administered Medications:   •  benzocaine (HURRICAINE) 20 % mucosal spray, , Mucosal, Once  •  lactated ringers infusion, 125 mL/hr, Intravenous, Continuous, 125 mL/hr at 09/27/23 1001    No Known Allergies    Objective     /78   Pulse 77   Temp (!) 97.3 °F (36.3 °C) (Temporal)   Resp 18   SpO2 96%       PHYSICAL EXAM    Gen: NAD  Head: NCAT  CV: RRR  CHEST: Clear  ABD: soft, NT/ND  EXT: no edema      ASSESSMENT/PLAN:  This is a 51y.o. year old male here for EGD and colonoscopy, and he is stable and optimized for his procedure.

## 2023-09-27 NOTE — ANESTHESIA PREPROCEDURE EVALUATION
Procedure:  COLONOSCOPY  EGD    Relevant Problems   CARDIO   (+) NASCIMENTO (dyspnea on exertion)   (+) Primary hypertension      GI/HEPATIC   (+) Fatty liver   (+) GERD (gastroesophageal reflux disease)      PULMONARY   (+) NASCIMENTO (dyspnea on exertion)        Physical Exam    Airway    Mallampati score: II  TM Distance: >3 FB  Neck ROM: full     Dental   No notable dental hx     Cardiovascular  Rhythm: regular, Rate: normal,     Pulmonary  Breath sounds clear to auscultation,     Other Findings  Intercisor Distance > 3cm          Anesthesia Plan  ASA Score- 2     Anesthesia Type- IV sedation with anesthesia with ASA Monitors. Additional Monitors:   Airway Plan:     Comment: NPO appropriate. Discussed benefits/risks of monitored anesthetic care which involves providing a dynamic level of mild to deep sedation. Complications include awareness and/or airway obstruction/aspiration which may necessitate conversion to general anesthesia. All questions answered. Patient understands and wishes to proceed. .       Plan Factors-Exercise tolerance (METS): >4 METS. Chart reviewed. EKG reviewed. Existing labs reviewed. Induction-     Postoperative Plan- Plan for postoperative opioid use. Informed Consent- Anesthetic plan and risks discussed with patient. I personally reviewed this patient with the CRNA. Discussed and agreed on the Anesthesia Plan with the CRNA. Wilber Hubbard

## 2023-09-27 NOTE — DISCHARGE INSTRUCTIONS
..Upper Endoscopy and Colonoscopy   WHAT YOU NEED TO KNOW:   An upper endoscopy is also called an upper gastrointestinal (GI) endoscopy, or an esophagogastroduodenoscopy (EGD). It is a procedure to examine the inside of your esophagus, stomach, and duodenum (first part of the small intestine) with a scope. You may feel bloated, gassy, or have some abdominal discomfort after your procedure. Your throat may be sore for 24 to 36 hours. You may burp or pass gas from air that is still inside your body. A colonoscopy is a procedure to examine the inside of your colon (intestine) with a scope. Polyps or tissue growths may have been removed during your colonoscopy. It is normal to feel bloated and to have some abdominal discomfort. You should be passing gas. If you have hemorrhoids or you had polyps removed, you may have a small amount of bleeding. DISCHARGE INSTRUCTIONS:   Seek care immediately if:   You have sudden, severe abdominal pain. You have problems swallowing. You have a large amount of black, sticky bowel movements or blood in your bowel movements. You have sudden trouble breathing. You feel weak, lightheaded, or faint or your heart beats faster than normal for you. Contact your healthcare provider if:   You have a fever and chills. You have nausea or are vomiting. Your abdomen is bloated or feels full and hard. You have abdominal pain. You have a large amount of black, sticky bowel movements or blood in your bowel movements. You have not had a bowel movement for 3 days after your procedure. You have rash or hives. You have questions or concerns about your procedure. Activity:   ·       Do not lift, strain, or run for 24 hours after your procedure. ·       Rest after your procedure. You have been given medicine to relax you. Do not drive or make important decisions until the day after your procedure.  Return to your normal activity as directed. ·       Relieve gas and discomfort from bloating by lying on your right side with a heating pad on your abdomen. You may need to take short walks to help the gas move out. Eat small meals until bloating is relieved. Follow up with your healthcare provider as directed: Write down your questions so you remember to ask them during your visits. If you take a “blood thinner”, please review the specific instructions from your endoscopist about when you should resume it. These can be found in the “Recommendation” and “Your Medication list” sections of this After Visit Summary.

## 2023-09-28 NOTE — ADDENDUM NOTE
Addendum  created 09/28/23 1213 by Pamela Calhoun MD    Attestation recorded in [facility] [place] filed

## 2023-10-02 PROCEDURE — 88305 TISSUE EXAM BY PATHOLOGIST: CPT | Performed by: PATHOLOGY

## 2023-10-03 NOTE — RESULT ENCOUNTER NOTE
Please inform patient that biopsies from the stomach were benign and negative for H. pylori. Biopsies from the small polyp in the esophagus was unremarkable. Colon polyps were all benign, 3 these polyps were precancerous and completely removed. Please recall for repeat colonoscopy in 3 years. Repeat upper endoscopy in 1 year to follow-up on prominent fold in the stomach. Follow-up in my office in about 4 months. Await pulmonary consultation.   Thank you

## 2023-10-20 ENCOUNTER — CONSULT (OUTPATIENT)
Dept: PULMONOLOGY | Facility: CLINIC | Age: 48
End: 2023-10-20

## 2023-10-20 ENCOUNTER — APPOINTMENT (OUTPATIENT)
Dept: RADIOLOGY | Facility: CLINIC | Age: 48
End: 2023-10-20
Payer: COMMERCIAL

## 2023-10-20 VITALS
BODY MASS INDEX: 31.55 KG/M2 | HEART RATE: 74 BPM | DIASTOLIC BLOOD PRESSURE: 68 MMHG | HEIGHT: 69 IN | SYSTOLIC BLOOD PRESSURE: 122 MMHG | OXYGEN SATURATION: 95 % | WEIGHT: 213 LBS | TEMPERATURE: 98.2 F

## 2023-10-20 DIAGNOSIS — R06.09 DOE (DYSPNEA ON EXERTION): Primary | ICD-10-CM

## 2023-10-20 DIAGNOSIS — E66.09 CLASS 1 OBESITY DUE TO EXCESS CALORIES WITHOUT SERIOUS COMORBIDITY WITH BODY MASS INDEX (BMI) OF 31.0 TO 31.9 IN ADULT: ICD-10-CM

## 2023-10-20 DIAGNOSIS — R06.02 SHORTNESS OF BREATH: ICD-10-CM

## 2023-10-20 DIAGNOSIS — J45.990 EXERCISE-INDUCED ASTHMA: ICD-10-CM

## 2023-10-20 DIAGNOSIS — K21.9 GASTROESOPHAGEAL REFLUX DISEASE WITHOUT ESOPHAGITIS: ICD-10-CM

## 2023-10-20 PROBLEM — E66.811 CLASS 1 OBESITY DUE TO EXCESS CALORIES WITHOUT SERIOUS COMORBIDITY WITH BODY MASS INDEX (BMI) OF 31.0 TO 31.9 IN ADULT: Status: ACTIVE | Noted: 2023-10-20

## 2023-10-20 PROCEDURE — 71046 X-RAY EXAM CHEST 2 VIEWS: CPT

## 2023-10-20 RX ORDER — ALBUTEROL SULFATE 90 UG/1
2 AEROSOL, METERED RESPIRATORY (INHALATION) EVERY 6 HOURS PRN
Qty: 18 G | Refills: 2 | Status: SHIPPED | OUTPATIENT
Start: 2023-10-20

## 2023-10-20 NOTE — ASSESSMENT & PLAN NOTE
Patient is a never smoker who describes cough and dyspnea with running on a treadmill. Otherwise no significant symptoms. His exam is normal.    This could be consistent with exercise-induced asthma  Prescribe albuterol to be used PRN and 15-30 minutes before exercise. Reviewed MDI technique  Check PFTs with bronchodilator  Check chest xray as I have no imaging. If asthma is confirmed will prescribe ICS  His cough seems to have preceded initiation of lisinopril and seems to be more consistent with exercise-induced asthma given concomitant dyspnea and its occurrence with running but I informed him cough is a side effect of lisinopril and to consider discussing switching medications with PCP if symptoms do not improve.     RTC 2-3 months

## 2023-10-20 NOTE — PROGRESS NOTES
Pulmonary Outpatient Note   Domitila Chu 51 y.o. male MRN: [id number]  10/20/2023      Referring Physician: Sahil Moyer,*    Reason for Consultation:    Chief Complaint   Patient presents with    Consult     He coughs often when exercising    Cough    Shortness of Breath         Assessment/Plan:    1. NASCIMENTO (dyspnea on exertion)  Assessment & Plan:  Patient is a never smoker who describes cough and dyspnea with running on a treadmill. Otherwise no significant symptoms. His exam is normal.    This could be consistent with exercise-induced asthma  Prescribe albuterol to be used PRN and 15-30 minutes before exercise. Reviewed MDI technique  Check PFTs with bronchodilator  Check chest xray as I have no imaging. If asthma is confirmed will prescribe ICS  His cough seems to have preceded initiation of lisinopril and seems to be more consistent with exercise-induced asthma given concomitant dyspnea and its occurrence with running but I informed him cough is a side effect of lisinopril and to consider discussing switching medications with PCP if symptoms do not improve. RTC 2-3 months        Orders:  -     Ambulatory Referral to Pulmonology    2. Exercise-induced asthma  -     Complete PFT with post bronchodilator; Future  -     albuterol (Ventolin HFA) 90 mcg/act inhaler; Inhale 2 puffs every 6 (six) hours as needed for wheezing    3. Shortness of breath  -     XR chest pa & lateral; Future; Expected date: 10/20/2023  -     Complete PFT with post bronchodilator; Future  -     albuterol (Ventolin HFA) 90 mcg/act inhaler; Inhale 2 puffs every 6 (six) hours as needed for wheezing    4. Gastroesophageal reflux disease without esophagitis  Assessment & Plan:  Controlled currently on pepcid and omeprazole. Had recent unremarkable EGD      5.  Class 1 obesity due to excess calories without serious comorbidity with body mass index (BMI) of 31.0 to 31.9 in adult  Assessment & Plan:  Patient reports no recent weight change. Health Maintenance  Immunization History   Administered Date(s) Administered    Tuberculin Skin Test-PPD Intradermal 02/12/2018    Got influenza vaccine at pharmacy      Return in about 2 months (around 12/20/2023). History of Present Illness   HPI:  Nick Arriaga is a 51 y.o. male who has a history of hypertension and GERD who presents for evaluation of dyspnea on exertion and cough. Started noticing cough first- around 6 months or so ago. The cough has not worsened- about the same. Dry cough- usually occurs when he is exerting himself. Notices it the most on the treadmill when he is running. Does not wake up at night coughing. Does not cough when sitting. Used to cough when he lay down- not anymore. Has acid reflux/GERD- which is mostly controlled on famotidine and PPI. Had EGD which was for the most part unremarkable. No allergies he knows of. No post-nasal drip. He also started noticing shortness of breath around 6 months. Harder to exercise. This is around the same. He exercises- runs 3-4 x a week- 2 miles at 9 minute mile pace. Stayed around the same but it is a little harder. Can't increase his distance. Lifts weights a couple times  a week. No wheeze. Not short of breath at rest.  Lays flat at night. No orthopnea. No PND. No issues sleeping. He does snore at night. No witnessed apneas. Does not nap. No excessive daytime sleepiness. Started the lisinopril around 6 months ago but he thinks his cough started a bit before that.     Wt Readings from Last 3 Encounters:   10/20/23 96.6 kg (213 lb)   08/24/23 97.3 kg (214 lb 6.4 oz)   06/09/23 98.9 kg (218 lb)         Pulmonary history:    Childhood lung disease/premature birth: No    Family hx of lung disease: No    # of ED/hospitalizations this year: No    Most exertional activity: Treadmill, lifting weights    Autoimmune history:    Prior rheumatologic diagnosis: No    Joint swelling or pain: No    Raynaud's: No    Dry mouth/eyes: No    Rash: No    Dysphagia/Reflux: Yes- on medication    Leg swelling: No    Exposure history:    Exposure to pets/birds/farm animals: dog. No birds    Down comforters/feather pillows: No    Carpets/Mold/Roaches:No pests or mold. + carpeting      Medications, herbs, supplements: MVI    Travel recently: [place], [place]    Social history:    Smoking: Never smoker, no vaping    Alcohol, ilicit drugs (inhalational/ IV): No drugs. 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 HENT:  Negative for ear pain and sore throat. Eyes:  Negative for pain and visual disturbance. Respiratory:  Positive for cough and shortness of breath. Negative for wheezing. Cardiovascular:  Negative for chest pain and palpitations. Gastrointestinal:  Negative for abdominal pain and vomiting. Genitourinary:  Negative for dysuria and hematuria. Musculoskeletal:  Negative for arthralgias and back pain. Skin:  Negative for color change and rash. Neurological:  Negative for seizures and syncope. All other systems reviewed and are negative.           Historical Information   Past Medical History:   Diagnosis Date    GERD (gastroesophageal reflux disease) Years ago    Has gotten worse last few months    Hyperlipidemia      Past Surgical History:   Procedure Laterality Date    HAND EXTENSOR TENDON EXCISION      KNEE ARTHROSCOPY       Family History   Problem Relation Age of Onset    Coronary artery disease Father          Meds/Allergies     Current Outpatient Medications:     albuterol (Ventolin HFA) 90 mcg/act inhaler, Inhale 2 puffs every 6 (six) hours as needed for wheezing, Disp: 18 g, Rfl: 2    famotidine (PEPCID) 40 MG tablet, Take 1 tablet (40 mg total) by mouth daily at bedtime Take in evening 2 hours prior to bed, Disp: 90 tablet, Rfl: 3    lisinopril (ZESTRIL) 30 mg tablet, Take 1 tablet (30 mg total) by mouth daily, Disp: 90 tablet, Rfl: 3    omeprazole (PriLOSEC) 40 MG capsule, Take 1 capsule (40 mg total) by mouth daily Take 30 minutes to one hour before one meal a day, Disp: 90 capsule, Rfl: 1  No Known Allergies    Vitals: Blood pressure 122/68, pulse 74, temperature 98.2 °F (36.8 °C), temperature source Temporal, height 5' 9" (1.753 m), weight 96.6 kg (213 lb), SpO2 95 %. Body mass index is 31.45 kg/m². Oxygen Therapy  SpO2: 95 %      Physical Exam  Physical Exam  Vitals and nursing note reviewed. Constitutional:       General: He is not in acute distress. Appearance: He is well-developed. HENT:      Head: Normocephalic and atraumatic. Eyes:      Conjunctiva/sclera: Conjunctivae normal.   Cardiovascular:      Rate and Rhythm: Normal rate and regular rhythm. Heart sounds: No murmur heard. Pulmonary:      Effort: Pulmonary effort is normal. No respiratory distress. Breath sounds: Normal breath sounds. Abdominal:      Palpations: Abdomen is soft. Tenderness: There is no abdominal tenderness. Musculoskeletal:         General: No swelling. Cervical back: Neck supple. Right lower leg: No edema. Left lower leg: No edema. Skin:     General: Skin is warm and dry. Capillary Refill: Capillary refill takes less than 2 seconds. Neurological:      Mental Status: He is alert. Psychiatric:         Mood and Affect: Mood normal.         Labs: I have personally reviewed pertinent lab results.     ABG: No results found for: "PHART", "GQY2UEO", "PO2ART", "SWL5RLX", "X6EMYERR", "BEART", "SOURCE",   BNP: No results found for: "BNP",   CBC:  Lab Results   Component Value Date    WBC 5.60 09/21/2023    HGB 16.5 09/21/2023    HCT 47.4 09/21/2023    MCV 89 09/21/2023     09/21/2023    EOSPCT 2 04/08/2022    EOSABS 0.10 04/08/2022    NEUTOPHILPCT 59 04/08/2022    LYMPHOPCT 29 04/08/2022   ,   CMP:   Lab Results   Component Value Date    SODIUM 138 09/21/2023    K 4.4 09/21/2023     09/21/2023    CO2 24 09/21/2023    BUN 36 (H) 09/21/2023    CREATININE 1.06 09/21/2023    CALCIUM 9.7 09/21/2023    AST 28 09/21/2023    ALT 58 (H) 09/21/2023    ALKPHOS 51 09/21/2023    EGFR 82 09/21/2023   ,   PT/INR: No results found for: "PT", "INR",   Troponin: No results found for: "TROPONINI"      Imaging and other studies: I have personally reviewed pertinent reports. and I have personally reviewed pertinent films in PACS    None for review    Pulmonary function testing:   Pulmonary Functions Testing Results:    No results found for: "FEV1", "FVC", "SFU1MXT", "TLC", "DLCO"        EKG, Pathology, and Other Studies: I have personally reviewed pertinent reports. Craol Carroll M.D.   [facility]

## 2023-10-23 PROBLEM — Z12.12 ENCOUNTER FOR COLORECTAL CANCER SCREENING: Status: RESOLVED | Noted: 2023-08-24 | Resolved: 2023-10-23

## 2023-10-23 PROBLEM — Z12.11 ENCOUNTER FOR COLORECTAL CANCER SCREENING: Status: RESOLVED | Noted: 2023-08-24 | Resolved: 2023-10-23

## 2023-11-02 ENCOUNTER — HOSPITAL ENCOUNTER (OUTPATIENT)
Dept: PULMONOLOGY | Facility: HOSPITAL | Age: 48
End: 2023-11-02
Payer: COMMERCIAL

## 2023-11-02 DIAGNOSIS — R06.02 SHORTNESS OF BREATH: ICD-10-CM

## 2023-11-02 DIAGNOSIS — J45.990 EXERCISE-INDUCED ASTHMA: ICD-10-CM

## 2023-11-02 PROCEDURE — 94729 DIFFUSING CAPACITY: CPT | Performed by: INTERNAL MEDICINE

## 2023-11-02 PROCEDURE — 94060 EVALUATION OF WHEEZING: CPT | Performed by: INTERNAL MEDICINE

## 2023-11-02 PROCEDURE — 94726 PLETHYSMOGRAPHY LUNG VOLUMES: CPT | Performed by: INTERNAL MEDICINE

## 2023-11-02 PROCEDURE — 94760 N-INVAS EAR/PLS OXIMETRY 1: CPT

## 2023-11-02 PROCEDURE — 94726 PLETHYSMOGRAPHY LUNG VOLUMES: CPT

## 2023-11-02 PROCEDURE — 94729 DIFFUSING CAPACITY: CPT

## 2023-11-02 PROCEDURE — 94060 EVALUATION OF WHEEZING: CPT

## 2023-11-02 RX ORDER — ALBUTEROL SULFATE 2.5 MG/3ML
2.5 SOLUTION RESPIRATORY (INHALATION) ONCE AS NEEDED
Status: COMPLETED | OUTPATIENT
Start: 2023-11-02 | End: 2023-11-02

## 2023-11-02 RX ADMIN — ALBUTEROL SULFATE 2.5 MG: 2.5 SOLUTION RESPIRATORY (INHALATION) at 08:16

## 2023-12-11 ENCOUNTER — OFFICE VISIT (OUTPATIENT)
Dept: PULMONOLOGY | Facility: CLINIC | Age: 48
End: 2023-12-11
Payer: COMMERCIAL

## 2023-12-11 VITALS
WEIGHT: 218.8 LBS | HEIGHT: 69 IN | BODY MASS INDEX: 32.41 KG/M2 | TEMPERATURE: 98.6 F | DIASTOLIC BLOOD PRESSURE: 82 MMHG | SYSTOLIC BLOOD PRESSURE: 138 MMHG | OXYGEN SATURATION: 95 % | HEART RATE: 74 BPM

## 2023-12-11 DIAGNOSIS — J45.990 EXERCISE-INDUCED ASTHMA: Primary | ICD-10-CM

## 2023-12-11 DIAGNOSIS — K21.9 GASTROESOPHAGEAL REFLUX DISEASE, UNSPECIFIED WHETHER ESOPHAGITIS PRESENT: ICD-10-CM

## 2023-12-11 DIAGNOSIS — R05.3 CHRONIC COUGH: ICD-10-CM

## 2023-12-11 PROCEDURE — 99214 OFFICE O/P EST MOD 30 MIN: CPT | Performed by: INTERNAL MEDICINE

## 2023-12-11 RX ORDER — BUDESONIDE AND FORMOTEROL FUMARATE DIHYDRATE 80; 4.5 UG/1; UG/1
2 AEROSOL RESPIRATORY (INHALATION) 2 TIMES DAILY
Qty: 10.2 G | Refills: 3 | Status: SHIPPED | OUTPATIENT
Start: 2023-12-11

## 2023-12-11 NOTE — PROGRESS NOTES
Pulmonary Outpatient Note   Erik Quiroz 51 y.o. male MRN: [id number]  12/11/2023        Reason for Consultation:    Chief Complaint   Patient presents with    Follow-up     He has a cough and this is a follow up for that    Cough    Shortness of Breath         Assessment/Plan:    1. Exercise-induced asthma  Assessment & Plan: Add Symbicort 80-4.5 mcg 2 puffs BID  Continue Albuterol PRN and 15-30 minutes before exercise    Orders:  -     budesonide-formoterol (Symbicort) 80-4.5 MCG/ACT inhaler; Inhale 2 puffs 2 (two) times a day Rinse mouth after use. 2. Chronic cough  Assessment & Plan:  Only bothersome with exercise. Suspect related to exercise-induced asthma. Start Symbicort, continue PRN albuterol. Other considerations- GERD- this is completely controlled. No longer with cough at night with heartburn  ACEi- he thinks the cough predates starting lisinopril. Still- he could consider switching anti-hypertensives. He will discuss with his PCP. 3. Gastroesophageal reflux disease, unspecified whether esophagitis present  Assessment & Plan:  Currently no symptoms of GERD  On Pepcid and prilosec              Health Maintenance  Immunization History   Administered Date(s) Administered    Tuberculin Skin Test-PPD Intradermal 02/12/2018    Got influenza vaccine at pharmacy      Return in about 3 months (around 3/11/2024). History of Present Illness   HPI:  Erik Quiroz is a 51 y.o. male who has a history of hypertension and GERD who presents for follow up of dyspnea on exertion and cough. Seen last in October- trialed on PRN albuterol, also to be used before exercise. Testing in interim- normal Chest xray  Normal PFT    Same symptoms as last visit. He can still run 2 miles at a 9 minute pace. Some dyspnea with running like that. + chest tightness. No wheezing  Coughing when that occurs. He lifts weights as well- had some chest tightness and throat tightness    No heartburn.  On pepcid and PPI    No post-nasal drip. No allergy symptoms. The albuterol helped initially taking 15 minutes before exercise- less so now. No night-time symptoms currently- was coughing at night before the acid reflux was started. Cough-  Answers submitted by the patient for this visit:  Pulmonology Questionnaire (Submitted on 12/8/2023)  Chief Complaint: Primary symptoms  Chronicity: recurrent  When did you first notice your symptoms?: more than 1 month ago  Since you first noticed this problem, how has it changed?: unchanged  Which of the following makes your symptoms worse?: exercise      October 2023 Consult  Started noticing cough first- around 6 months or so ago. The cough has not worsened- about the same. Dry cough- usually occurs when he is exerting himself. Notices it the most on the treadmill when he is running. Does not wake up at night coughing. Does not cough when sitting. Used to cough when he lay down- not anymore. Has acid reflux/GERD- which is mostly controlled on famotidine and PPI. Had EGD which was for the most part unremarkable. No allergies he knows of. No post-nasal drip. He also started noticing shortness of breath around 6 months. Harder to exercise. This is around the same. He exercises- runs 3-4 x a week- 2 miles at 9 minute mile pace. Stayed around the same but it is a little harder. Can't increase his distance. Lifts weights a couple times  a week. No wheeze. Not short of breath at rest.  Lays flat at night. No orthopnea. No PND. No issues sleeping. He does snore at night. No witnessed apneas. Does not nap. No excessive daytime sleepiness. Started the lisinopril around 6 months ago but he thinks his cough started a bit before that.     Wt Readings from Last 3 Encounters:   12/11/23 99.2 kg (218 lb 12.8 oz)   10/20/23 96.6 kg (213 lb)   08/24/23 97.3 kg (214 lb 6.4 oz)       Pulmonary history:    Childhood lung disease/premature birth: No    Family hx of lung disease: No    # of ED/hospitalizations this year: No    Most exertional activity: Treadmill, lifting weights    Autoimmune history:    Prior rheumatologic diagnosis: No    Joint swelling or pain: No    Raynaud's: No    Dry mouth/eyes: No    Rash: No    Dysphagia/Reflux: Yes- on medication    Leg swelling: No    Exposure history:    Exposure to pets/birds/farm animals: dog. No birds    Down comforters/feather pillows: No    Carpets/Mold/Roaches:No pests or mold. + carpeting      Medications, herbs, supplements: MVI    Travel recently: [place], [place]    Social history:    Smoking: Never smoker, no vaping    Alcohol, ilicit drugs (inhalational/ IV): No drugs. 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 HENT:  Negative for ear pain and sore throat. Eyes:  Negative for pain and visual disturbance. Respiratory:  Positive for cough and shortness of breath (with significant exercise). Cardiovascular:  Negative for chest pain and palpitations. Gastrointestinal:  Negative for abdominal pain and vomiting. Genitourinary:  Negative for dysuria and hematuria. Musculoskeletal:  Negative for arthralgias and back pain. Skin:  Negative for color change and rash. Neurological:  Negative for seizures and syncope. All other systems reviewed and are negative.           Historical Information   Past Medical History:   Diagnosis Date    GERD (gastroesophageal reflux disease) Years ago    Has gotten worse last few months    Hyperlipidemia      Past Surgical History:   Procedure Laterality Date    HAND EXTENSOR TENDON EXCISION      KNEE ARTHROSCOPY       Family History   Problem Relation Age of Onset    Coronary artery disease Father          Meds/Allergies     Current Outpatient Medications:     albuterol (Ventolin HFA) 90 mcg/act inhaler, Inhale 2 puffs every 6 (six) hours as needed for wheezing, Disp: 18 g, Rfl: 2    budesonide-formoterol (Symbicort) 80-4.5 MCG/ACT inhaler, Inhale 2 puffs 2 (two) times a day Rinse mouth after use., Disp: 10.2 g, Rfl: 3    famotidine (PEPCID) 40 MG tablet, Take 1 tablet (40 mg total) by mouth daily at bedtime Take in evening 2 hours prior to bed, Disp: 90 tablet, Rfl: 3    lisinopril (ZESTRIL) 30 mg tablet, Take 1 tablet (30 mg total) by mouth daily, Disp: 90 tablet, Rfl: 3    omeprazole (PriLOSEC) 40 MG capsule, Take 1 capsule (40 mg total) by mouth daily Take 30 minutes to one hour before one meal a day, Disp: 90 capsule, Rfl: 1  No Known Allergies    Vitals: Blood pressure 138/82, pulse 74, temperature 98.6 °F (37 °C), temperature source Temporal, height 5' 9" (1.753 m), weight 99.2 kg (218 lb 12.8 oz), SpO2 95 %. Body mass index is 32.31 kg/m². Oxygen Therapy  SpO2: 95 %      Physical Exam  Physical Exam  Vitals and nursing note reviewed. Constitutional:       General: He is not in acute distress. Appearance: He is well-developed. HENT:      Head: Normocephalic and atraumatic. Eyes:      Conjunctiva/sclera: Conjunctivae normal.   Cardiovascular:      Rate and Rhythm: Normal rate and regular rhythm. Heart sounds: No murmur heard. Pulmonary:      Effort: Pulmonary effort is normal. No respiratory distress. Breath sounds: Normal breath sounds. Abdominal:      Palpations: Abdomen is soft. Tenderness: There is no abdominal tenderness. Musculoskeletal:         General: No swelling. Cervical back: Neck supple. Skin:     General: Skin is warm and dry. Capillary Refill: Capillary refill takes less than 2 seconds. Neurological:      Mental Status: He is alert. Psychiatric:         Mood and Affect: Mood normal.         Labs: I have personally reviewed pertinent lab results.     ABG: No results found for: "PHART", "IDT2UJZ", "PO2ART", "HYH8SZP", "T1KMMCMW", "BEART", "SOURCE",   BNP: No results found for: "BNP",   CBC:  Lab Results   Component Value Date    WBC 5.60 09/21/2023    HGB 16.5 09/21/2023    HCT 47.4 09/21/2023    MCV 89 09/21/2023     09/21/2023    EOSPCT 2 04/08/2022    EOSABS 0.10 04/08/2022    NEUTOPHILPCT 59 04/08/2022    LYMPHOPCT 29 04/08/2022   ,   CMP:   Lab Results   Component Value Date    SODIUM 138 09/21/2023    K 4.4 09/21/2023     09/21/2023    CO2 24 09/21/2023    BUN 36 (H) 09/21/2023    CREATININE 1.06 09/21/2023    CALCIUM 9.7 09/21/2023    AST 28 09/21/2023    ALT 58 (H) 09/21/2023    ALKPHOS 51 09/21/2023    EGFR 82 09/21/2023   ,   PT/INR: No results found for: "PT", "INR",   Troponin: No results found for: "TROPONINI"      Imaging and other studies: I have personally reviewed pertinent reports. and I have personally reviewed pertinent films in PACS    CXR 10/20/23  Normal  Lungs clear    Pulmonary function testing:   Pulmonary Functions Testing Results:  PFT 10/20/2023  Normal total lung capacity with increased residual volume consistent with air trapping. Normal airflow and vital capacity. No significant improvement in airflow or vital capacity following the administration of bronchodilators. Normal diffusion capacity. Normal airway resistance as indicated by the specific airway conductance. EKG, Pathology, and Other Studies: I have personally reviewed pertinent reports. Candelario Mariano M.D.   [facility]

## 2023-12-11 NOTE — ASSESSMENT & PLAN NOTE
Only bothersome with exercise. Suspect related to exercise-induced asthma. Start Symbicort, continue PRN albuterol. Other considerations- GERD- this is completely controlled. No longer with cough at night with heartburn  ACEi- he thinks the cough predates starting lisinopril. Still- he could consider switching anti-hypertensives. He will discuss with his PCP.

## 2023-12-12 ENCOUNTER — OFFICE VISIT (OUTPATIENT)
Dept: FAMILY MEDICINE CLINIC | Facility: CLINIC | Age: 48
End: 2023-12-12
Payer: COMMERCIAL

## 2023-12-12 VITALS
RESPIRATION RATE: 18 BRPM | HEIGHT: 69 IN | DIASTOLIC BLOOD PRESSURE: 80 MMHG | BODY MASS INDEX: 32.58 KG/M2 | SYSTOLIC BLOOD PRESSURE: 138 MMHG | WEIGHT: 220 LBS | OXYGEN SATURATION: 98 % | HEART RATE: 69 BPM | TEMPERATURE: 97.5 F

## 2023-12-12 DIAGNOSIS — Z83.49 FAMILY HISTORY OF THYROID DISEASE IN FATHER: ICD-10-CM

## 2023-12-12 DIAGNOSIS — I10 PRIMARY HYPERTENSION: ICD-10-CM

## 2023-12-12 DIAGNOSIS — E66.09 CLASS 1 OBESITY DUE TO EXCESS CALORIES WITHOUT SERIOUS COMORBIDITY WITH BODY MASS INDEX (BMI) OF 31.0 TO 31.9 IN ADULT: ICD-10-CM

## 2023-12-12 DIAGNOSIS — J45.990 EXERCISE-INDUCED ASTHMA: ICD-10-CM

## 2023-12-12 DIAGNOSIS — E78.2 MIXED HYPERLIPIDEMIA: ICD-10-CM

## 2023-12-12 DIAGNOSIS — K21.9 GASTROESOPHAGEAL REFLUX DISEASE WITHOUT ESOPHAGITIS: Primary | ICD-10-CM

## 2023-12-12 PROCEDURE — 99214 OFFICE O/P EST MOD 30 MIN: CPT | Performed by: NURSE PRACTITIONER

## 2023-12-12 NOTE — PROGRESS NOTES
Name: Génesis Rascon      : 1975      MRN: [id number]  Encounter Provider: DEVANG Guerrero  Encounter Date: 2023   Encounter department: [address]     1. Gastroesophageal reflux disease without esophagitis  -     Comprehensive metabolic panel; Future    2. Primary hypertension  -     Comprehensive metabolic panel; Future    3. Exercise-induced asthma    4. Class 1 obesity due to excess calories without serious comorbidity with body mass index (BMI) of 31.0 to 31.9 in adult    5. Mixed hyperlipidemia  -     Lipid panel; Future    6. Family history of thyroid disease in father  -     TSH, 3rd generation with Free T4 reflex; Future      BMI Counseling: Body mass index is 32.49 kg/m². The BMI is above normal. Nutrition recommendations include decreasing portion sizes, encouraging healthy choices of fruits and vegetables, limiting drinks that contain sugar, moderation in carbohydrate intake, increasing intake of lean protein and reducing intake of cholesterol. Exercise recommendations include exercising 3-5 times per week. Rationale for BMI follow-up plan is due to patient being overweight or obese. Depression Screening and Follow-up Plan: Patient was screened for depression during today's encounter. They screened negative with a PHQ-2 score of 0. Subjective      Patient here for routine 6 month follow up. Has seen gastroenterology, and pulmonology. Cough- albuterol as needed and symbicort started yesterday. Did not cough when not working out. Does not think it is the medication. But if the inhaler does not work then will try to switch off of ACE. Not coughing at night since taking Pepcid. HTN- well controlled. Taking lisinopril daily. Review of Systems   Constitutional: Negative. Negative for fatigue and fever. Respiratory: Negative. Negative for shortness of breath and wheezing. Cardiovascular: Negative.   Negative for chest pain and palpitations. Gastrointestinal: Negative. Negative for abdominal pain, diarrhea and nausea. Skin: Negative. Negative for rash. Neurological: Negative. Negative for dizziness, light-headedness and headaches. Psychiatric/Behavioral: Negative. Negative for decreased concentration, sleep disturbance and suicidal ideas. The patient is not nervous/anxious. Current Outpatient Medications on File Prior to Visit   Medication Sig    albuterol (Ventolin HFA) 90 mcg/act inhaler Inhale 2 puffs every 6 (six) hours as needed for wheezing    budesonide-formoterol (Symbicort) 80-4.5 MCG/ACT inhaler Inhale 2 puffs 2 (two) times a day Rinse mouth after use.    famotidine (PEPCID) 40 MG tablet Take 1 tablet (40 mg total) by mouth daily at bedtime Take in evening 2 hours prior to bed    lisinopril (ZESTRIL) 30 mg tablet Take 1 tablet (30 mg total) by mouth daily    omeprazole (PriLOSEC) 40 MG capsule Take 1 capsule (40 mg total) by mouth daily Take 30 minutes to one hour before one meal a day       Objective     /80   Pulse 69   Temp 97.5 °F (36.4 °C) (Tympanic)   Resp 18   Ht 5' 9" (1.753 m)   Wt 99.8 kg (220 lb)   SpO2 98%   BMI 32.49 kg/m²     Physical Exam  Vitals and nursing note reviewed. Constitutional:       General: He is not in acute distress. Appearance: Normal appearance. He is well-developed. He is not ill-appearing. Eyes:      General: Lids are normal.   Cardiovascular:      Rate and Rhythm: Normal rate and regular rhythm. Heart sounds: Normal heart sounds, S1 normal and S2 normal. No murmur heard. Pulmonary:      Effort: Pulmonary effort is normal. No respiratory distress. Breath sounds: Normal breath sounds. No decreased breath sounds or wheezing. Musculoskeletal:         General: No tenderness or deformity. Normal range of motion. Skin:     General: Skin is warm. Findings: No erythema or rash.    Neurological:      General: No focal deficit present. Mental Status: He is alert and oriented to person, place, and time. Psychiatric:         Behavior: Behavior normal. Behavior is cooperative. Thought Content:  Thought content normal.       DEVANG Acosta

## 2024-02-22 DIAGNOSIS — K21.9 GASTROESOPHAGEAL REFLUX DISEASE WITHOUT ESOPHAGITIS: ICD-10-CM

## 2024-02-22 RX ORDER — OMEPRAZOLE 40 MG/1
40 CAPSULE, DELAYED RELEASE ORAL DAILY
Qty: 90 CAPSULE | Refills: 1 | Status: SHIPPED | OUTPATIENT
Start: 2024-02-22

## 2024-02-24 ENCOUNTER — DOCUMENTATION (OUTPATIENT)
Dept: GASTROENTEROLOGY | Facility: CLINIC | Age: 49
End: 2024-02-24

## 2024-02-24 NOTE — PROGRESS NOTES
[facility]  Gastroenterology Outpatient Follow up  Patient Audie Goode   Age 49 y.o.   Gender male   MRN: [id number]  [facility] [id number]     ASSESSMENT AND PLAN:   Problem List Items Addressed This Visit          Digestive    GERD (gastroesophageal reflux disease) - Primary     Audie symptoms are greatly improved.  He has been experiencing coughing with lying down and with minimal exertion.  No evidence of Whittaker's esophagus or erosive esophagitis on medication at time of EGD.  Continue omeprazole 40 mg once a day, best to take 30 minutes to 1 hour before 1 meal a day.  He may stop using the famotidine 40 mg 2 hours before bed.  However if he notices increased in symptoms he should resume the famotidine (Pepcid).  Lifestyle modifications for gastroesophageal reflux disease were discussed and include limiting fried and fatty foods, mints, chocolates, carbonated and caffeinated beverages , and alcohol, etc.  Avoid lying down for 2-3 hours after meals.  If you have nighttime symptoms consider raising the head of the bed up on 4-6 inch blocks.  Pillows typically are not useful.  If you are overweight, weight loss will be helpful.           Fatty liver     Audie was noted to have fatty infiltration liver on imaging studies.  He did have an ultrasound elastography that revealed S3 but fortunately no evidence of scarring in the liver.  I did discuss with him that there are no FDA approved medications for fatty infiltration of liver.  The primary treatment at this point in time would be weight reduction with a goal of losing 7 to 10% of his body weight over the next 12 months.  Treatment of hyperlipidemia, he can discuss this further with his primary care provider.  Consider vaccination for  hepatitis B.  He is immune to hepatitis A.            Other    History of colon polyps     Audie was noted to have colon polyps at the time of his initial colonoscopy performed on 9/27/2023.  Based upon findings at  time of the colonoscopy he will need a surveillance colonoscopy in September 2026.           Abnormal findings on esophagogastroduodenoscopy (EGD)     Audie was noted to have a mildly prominent fold in the prepyloric region at time of EGD on September 27, 2023.  Biopsies unremarkable.  Did report recommend repeating upper endoscopy in 1 year thus this will be due around September or October 2024.  I will see him in the office in follow-up in August or early September and make arrangements for that procedure at that time.         Elevated ferritin     Audie was noted to have mildly elevated ferritin in his laboratory testing.  Would recommend repeating iron studies when he has his next blood draw.         Relevant Orders    Iron Panel (Includes Ferritin, Iron Sat%, Iron, and TIBC)      _____________________________________________________________    HPI:   Audie is a delightful 49-year-old gentleman comes in the office in follow-up for gastroesophageal reflux disease and fatty liver.  He reports that his upper GI symptoms are much improved.  Previously he would experience coughing once he would lie down.  He thought this was related to regurgitation.  He also was having coughing episodes with exercise.  Since going on omeprazole, famotidine, and seen pulmonary and being placed on a variety of inhalers his symptoms have significantly improved.  He denies any heartburn or reflux symptoms.  Denies any nausea vomiting or early satiety.  There is been no unintentional weight loss although he has been trying to lose weight.    His bowel habits been fairly regular, denies any diarrhea, constipation, bleeding or black stools.    12/11/2023 pulmonary follow-up  Exercise-induced asthma-Symbicort was added.  Chronic cough-worse with exercise  GERD    9/27/2023 EGD normal duodenum.  Mild erythema in the antrum.  Biopsies negative for H. pylori.  Mildly prominent fold in the prepyloric region and 11 o'clock position.  Biopsy  obtained.  Biopsy unremarkable.  4 mm polyp in the esophagus at 24 cm.  Biopsy unremarkable.  Z-line regular at 40 cm    Repeat EGD 1 year recommended    9/27/2023 colonoscopy  Normal terminal ileum  Two 4 mm sessile polyps in the ascending colon removed with cold snare.  Tubular adenoma  One 4 mm sessile polyp in the sigmoid colon 40 cm from the anal verge removed with cold snare.  Tubular adenoma  One pedunculated, adenomatous-appearing Ml Ip polyp measuring 10 mm at 18 cm from the anal verge removed with cold snare.  Hyperplastic polyp.    Diverticula of moderate severity in the sigmoid colon  Small external hemorrhoids  Colonic mucosa otherwise appeared normal    Repeat colonoscopy in 3 years recommended    9/21/2023 laboratory data  Hepatitis a antibody total-reactive  Hepatitis B surface antigen-nonreactive  Hepatitis B surface antibody-less than 3  Hepatitis B core antibody total nonreactive    Sodium 138  Potassium 4.4  BUN 36  Creatinine 1.06  AST 28  ALT 58  Alk phos 51  Albumin 4.9  T. bili 0.68  Cholesterol 236  Triglycerides 288    Ferritin 385  Iron saturation 33%  TIBC 294  WBC 5.60 Hgb 16.5 HCT 47.6 MCV 89 platelet count 276,000    8/31/2023 ultrasound elastography  F0 to F1  S3    5/10/2023 laboratory data  BUN was 28 creatinine 1.03  AST was 24 ALT 69  Albumin 4.5  T. bili 0.77  Cholesterol was 209  Triglycerides 174  HDL 54    Last CBC was 4/10/2022 and was normal  TSH 1.32  Hep C antibody nonreactive     8/15/2018 CT scan of the abdomen pelvis done for renal stone study  Faint tree-in-bud opacities at the base of the right middle lobe.  Hepatic steatosis  Spleen, pancreas, adrenal glands and kidneys are suboptimally evaluated on a nonenhanced image but demonstrate no acute pathology.  4 mm stone left ureter and mild left hydronephrosis  Small fat-containing umbilical hernia    No Known Allergies  Current Outpatient Medications   Medication Sig Dispense Refill    albuterol  "(Ventolin HFA) 90 mcg/act inhaler Inhale 2 puffs every 6 (six) hours as needed for wheezing 18 g 2    budesonide-formoterol (Symbicort) 80-4.5 MCG/ACT inhaler Inhale 2 puffs 2 (two) times a day Rinse mouth after use. 10.2 g 3    famotidine (PEPCID) 40 MG tablet Take 1 tablet (40 mg total) by mouth daily at bedtime Take in evening 2 hours prior to bed 90 tablet 3    lisinopril (ZESTRIL) 30 mg tablet Take 1 tablet (30 mg total) by mouth daily 90 tablet 3    omeprazole (PriLOSEC) 40 MG capsule Take 1 capsule (40 mg total) by mouth daily Take 30 minutes to one hour before one meal a day 90 capsule 1     No current facility-administered medications for this visit.     MEDICAL HISTORY:  Past Medical History:   Diagnosis Date    GERD (gastroesophageal reflux disease) Years ago    Has gotten worse last few months    Hyperlipidemia      Past Surgical History:   Procedure Laterality Date    HAND EXTENSOR TENDON EXCISION      KNEE ARTHROSCOPY       Social History     Substance and Sexual Activity   Alcohol Use Never    Comment: reports socially drinks alcohol     Social History     Substance and Sexual Activity   Drug Use Never     Social History     Tobacco Use   Smoking Status Never   Smokeless Tobacco Never     Family History   Problem Relation Age of Onset    Coronary artery disease Father          Objective   Blood pressure 110/80, pulse 72, resp. rate 18, height 5' 9\" (1.753 m), weight 96.2 kg (212 lb), SpO2 96%. Body mass index is 31.31 kg/m².    PHYSICAL EXAM:   General Appearance: Alert, cooperative, no distress  HEENT: Normocephalic, atraumatic, anicteric.   Neck: Supple, symmetrical, trachea midline  Lungs: Clear to auscultation bilaterally; no rales, rhonchi or wheezing; respirations unlabored   Heart: Regular rate and rhythm; no murmur, rub, or gallop.  Abdomen: Soft, bowel sounds normal, non-tender, non-distended; no masses, there is no hepatosplenomegaly. No spider angiomas.  Small reducible umbilical hernia.  " No stigmata of chronic liver disease.  Genitalia: Deferred   Rectal: Deferred   Extremities: No cyanosis, clubbing or edema   Skin: No jaundice, rashes, or lesions   Lymph nodes: No palpable cervical lymphadenopathy   Lab Results:     Radiology Results:   No results found.  Kyle Elizabeth DO   02/27/24   Cc:

## 2024-02-24 NOTE — PROGRESS NOTES
Following information was outlined below in preparation for office visit 1/27/2024 12/11/2023 pulmonary follow-up  Exercise-induced asthma-Symbicort was added.  Chronic cough-worse with exercise  GERD    9/27/2023 EGD normal duodenum.  Mild erythema in the antrum.  Biopsies negative for H. pylori.  Mildly prominent fold in the prepyloric region and 11 o'clock position.  Biopsy obtained.  Biopsy unremarkable.  4 mm polyp in the esophagus at 24 cm.  Biopsy unremarkable.  Irregular Z-line at 40 cm    Repeat EGD 1 year recommended    9/27/2023 colonoscopy  Normal terminal ileum  Two 4 mm sessile polyps in the ascending colon removed with cold snare.  Tubular adenoma  One 4 mm sessile polyp in the sigmoid colon 40 cm from the anal verge removed with cold snare.  Tubular adenoma  One pedunculated, adenomatous-appearing Ml Ip polyp measuring 10 mm at 18 cm from the anal verge removed with cold snare.  Hyperplastic polyp.    Diverticula of moderate severity in the sigmoid colon  Small external hemorrhoids  Colonic mucosa otherwise appeared normal    Repeat colonoscopy in 3 years recommended    9/21/2023 laboratory data  Hepatitis a antibody total-reactive  Hepatitis B surface antigen-nonreactive  Hepatitis B surface antibody-less than 3  Hepatitis B core antibody total nonreactive    Sodium 138  Potassium 4.4  BUN 36  Creatinine 1.06  AST 28  ALT 58  Alk phos 51  Albumin 4.9  T. bili 0.68  Cholesterol 236  Triglycerides 288    Ferritin 385  Iron saturation 33%  TIBC 294  WBC 5.60 Hgb 16.5 HCT 47.6 MCV 89 platelet count 276,000    8/31/2023 ultrasound elastography  F0 to F1  S3    5/10/2023 laboratory data  BUN was 28 creatinine 1.03  AST was 24 ALT 69  Albumin 4.5  T. bili 0.77  Cholesterol was 209  Triglycerides 174  HDL 54    Last CBC was 4/10/2022 and was normal  TSH 1.32  Hep C antibody nonreactive     8/15/2018 CT scan of the abdomen pelvis done for renal stone study  Faint tree-in-bud opacities at  the base of the right middle lobe.  Hepatic steatosis  Spleen, pancreas, adrenal glands and kidneys are suboptimally evaluated on a nonenhanced image but demonstrate no acute pathology.  4 mm stone left ureter and mild left hydronephrosis  Small fat-containing umbilical hernia

## 2024-02-27 ENCOUNTER — OFFICE VISIT (OUTPATIENT)
Dept: GASTROENTEROLOGY | Facility: CLINIC | Age: 49
End: 2024-02-27
Payer: COMMERCIAL

## 2024-02-27 VITALS
RESPIRATION RATE: 18 BRPM | BODY MASS INDEX: 31.4 KG/M2 | OXYGEN SATURATION: 96 % | DIASTOLIC BLOOD PRESSURE: 80 MMHG | HEIGHT: 69 IN | SYSTOLIC BLOOD PRESSURE: 110 MMHG | HEART RATE: 72 BPM | WEIGHT: 212 LBS

## 2024-02-27 DIAGNOSIS — K76.0 FATTY LIVER: ICD-10-CM

## 2024-02-27 DIAGNOSIS — R19.8 ABNORMAL FINDINGS ON ESOPHAGOGASTRODUODENOSCOPY (EGD): ICD-10-CM

## 2024-02-27 DIAGNOSIS — R79.89 ELEVATED FERRITIN: ICD-10-CM

## 2024-02-27 DIAGNOSIS — K21.9 GASTROESOPHAGEAL REFLUX DISEASE WITHOUT ESOPHAGITIS: Primary | ICD-10-CM

## 2024-02-27 DIAGNOSIS — Z86.010 HISTORY OF COLON POLYPS: ICD-10-CM

## 2024-02-27 PROBLEM — Z86.0100 HISTORY OF COLON POLYPS: Status: ACTIVE | Noted: 2024-02-27

## 2024-02-27 PROCEDURE — 99214 OFFICE O/P EST MOD 30 MIN: CPT | Performed by: INTERNAL MEDICINE

## 2024-02-27 NOTE — ASSESSMENT & PLAN NOTE
Audie symptoms are greatly improved.  He has been experiencing coughing with lying down and with minimal exertion.  No evidence of Whittaker's esophagus or erosive esophagitis on medication at time of EGD.  Continue omeprazole 40 mg once a day, best to take 30 minutes to 1 hour before 1 meal a day.  He may stop using the famotidine 40 mg 2 hours before bed.  However if he notices increased in symptoms he should resume the famotidine (Pepcid).  Lifestyle modifications for gastroesophageal reflux disease were discussed and include limiting fried and fatty foods, mints, chocolates, carbonated and caffeinated beverages , and alcohol, etc.  Avoid lying down for 2-3 hours after meals.  If you have nighttime symptoms consider raising the head of the bed up on 4-6 inch blocks.  Pillows typically are not useful.  If you are overweight, weight loss will be helpful.

## 2024-02-27 NOTE — ASSESSMENT & PLAN NOTE
Audie was noted to have fatty infiltration liver on imaging studies.  He did have an ultrasound elastography that revealed S3 but fortunately no evidence of scarring in the liver.  I did discuss with him that there are no FDA approved medications for fatty infiltration of liver.  The primary treatment at this point in time would be weight reduction with a goal of losing 7 to 10% of his body weight over the next 12 months.  Treatment of hyperlipidemia, he can discuss this further with his primary care provider.  Consider vaccination for  hepatitis B.  He is immune to hepatitis A.

## 2024-02-27 NOTE — ASSESSMENT & PLAN NOTE
Audie was noted to have colon polyps at the time of his initial colonoscopy performed on 9/27/2023.  Based upon findings at time of the colonoscopy he will need a surveillance colonoscopy in September 2026.

## 2024-02-27 NOTE — LETTER
February 27, 2024     Gilbert Jeffery MD  [address] [place] [address]    Patient: Audie Goode   YOB: 1975   Date of Visit: 2/27/2024       Dear Dr. Jeffery:    Thank you for referring Audie Goode to me for evaluation. Below are my notes for this consultation.    If you have questions, please do not hesitate to call me. I look forward to following your patient along with you.         Sincerely,        Kyle Stevenson DO [place]        CC: No Recipients    Kyle Elizabeth DO  2/27/2024  8:17 AM  Incomplete  [facility]  Gastroenterology Outpatient Follow up  Patient Audie Goode   Age 49 y.o.   Gender male   MRN: [id number]  [facility] [id number]     ASSESSMENT AND PLAN:   Problem List Items Addressed This Visit          Digestive    GERD (gastroesophageal reflux disease) - Primary     Audie symptoms are greatly improved.  He has been experiencing coughing with lying down and with minimal exertion.  No evidence of Whittaker's esophagus or erosive esophagitis on medication at time of EGD.  Continue omeprazole 40 mg once a day, best to take 30 minutes to 1 hour before 1 meal a day.  He may stop using the famotidine 40 mg 2 hours before bed.  However if he notices increased in symptoms he should resume the famotidine (Pepcid).  Lifestyle modifications for gastroesophageal reflux disease were discussed and include limiting fried and fatty foods, mints, chocolates, carbonated and caffeinated beverages , and alcohol, etc.  Avoid lying down for 2-3 hours after meals.  If you have nighttime symptoms consider raising the head of the bed up on 4-6 inch blocks.  Pillows typically are not useful.  If you are overweight, weight loss will be helpful.           Fatty liver     Audie was noted to have fatty infiltration liver on imaging studies.  He did have an ultrasound elastography that revealed S3 but fortunately no evidence of scarring in the liver.  I did discuss with  him that there are no FDA approved medications for fatty infiltration of liver.  The primary treatment at this point in time would be weight reduction with a goal of losing 7 to 10% of his body weight over the next 12 months.  Treatment of hyperlipidemia, he can discuss this further with his primary care provider.  Consider vaccination for  hepatitis B.  He is immune to hepatitis A.            Other    History of colon polyps     Audie was noted to have colon polyps at the time of his initial colonoscopy performed on 9/27/2023.  Based upon findings at time of the colonoscopy he will need a surveillance colonoscopy in September 2026.           Abnormal findings on esophagogastroduodenoscopy (EGD)     Audie was noted to have a mildly prominent fold in the prepyloric region at time of EGD on September 27, 2023.  Biopsies unremarkable.  Did report recommend repeating upper endoscopy in 1 year thus this will be due around September or October 2024.  I will see him in the office in follow-up in August or early September and make arrangements for that procedure at that time.         Elevated ferritin     Audie was noted to have mildly elevated ferritin in his laboratory testing.  Would recommend repeating iron studies when he has his next blood draw.           _____________________________________________________________    HPI:   Audie is a delightful 49-year-old gentleman comes in the office in follow-up for gastroesophageal reflux disease and fatty liver.  He reports that his upper GI symptoms are much improved.  Previously he would experience coughing once he would lie down.  He thought this was related to regurgitation.  He also was having coughing episodes with exercise.  Since going on omeprazole, famotidine, and seen pulmonary and being placed on a variety of inhalers his symptoms have significantly improved.  He denies any heartburn or reflux symptoms.  Denies any nausea vomiting or early satiety.  There is been  no unintentional weight loss although he has been trying to lose weight.    His bowel habits been fairly regular, denies any diarrhea, constipation, bleeding or black stools.    12/11/2023 pulmonary follow-up  Exercise-induced asthma-Symbicort was added.  Chronic cough-worse with exercise  GERD    9/27/2023 EGD normal duodenum.  Mild erythema in the antrum.  Biopsies negative for H. pylori.  Mildly prominent fold in the prepyloric region and 11 o'clock position.  Biopsy obtained.  Biopsy unremarkable.  4 mm polyp in the esophagus at 24 cm.  Biopsy unremarkable.  Z-line regular at 40 cm    Repeat EGD 1 year recommended    9/27/2023 colonoscopy  Normal terminal ileum  Two 4 mm sessile polyps in the ascending colon removed with cold snare.  Tubular adenoma  One 4 mm sessile polyp in the sigmoid colon 40 cm from the anal verge removed with cold snare.  Tubular adenoma  One pedunculated, adenomatous-appearing Ml Ip polyp measuring 10 mm at 18 cm from the anal verge removed with cold snare.  Hyperplastic polyp.    Diverticula of moderate severity in the sigmoid colon  Small external hemorrhoids  Colonic mucosa otherwise appeared normal    Repeat colonoscopy in 3 years recommended    9/21/2023 laboratory data  Hepatitis a antibody total-reactive  Hepatitis B surface antigen-nonreactive  Hepatitis B surface antibody-less than 3  Hepatitis B core antibody total nonreactive    Sodium 138  Potassium 4.4  BUN 36  Creatinine 1.06  AST 28  ALT 58  Alk phos 51  Albumin 4.9  T. bili 0.68  Cholesterol 236  Triglycerides 288    Ferritin 385  Iron saturation 33%  TIBC 294  WBC 5.60 Hgb 16.5 HCT 47.6 MCV 89 platelet count 276,000    8/31/2023 ultrasound elastography  F0 to F1  S3    5/10/2023 laboratory data  BUN was 28 creatinine 1.03  AST was 24 ALT 69  Albumin 4.5  T. bili 0.77  Cholesterol was 209  Triglycerides 174  HDL 54    Last CBC was 4/10/2022 and was normal  TSH 1.32  Hep C antibody nonreactive     8/15/2018  "CT scan of the abdomen pelvis done for renal stone study  Faint tree-in-bud opacities at the base of the right middle lobe.  Hepatic steatosis  Spleen, pancreas, adrenal glands and kidneys are suboptimally evaluated on a nonenhanced image but demonstrate no acute pathology.  4 mm stone left ureter and mild left hydronephrosis  Small fat-containing umbilical hernia    No Known Allergies  Current Outpatient Medications   Medication Sig Dispense Refill   • albuterol (Ventolin HFA) 90 mcg/act inhaler Inhale 2 puffs every 6 (six) hours as needed for wheezing 18 g 2   • budesonide-formoterol (Symbicort) 80-4.5 MCG/ACT inhaler Inhale 2 puffs 2 (two) times a day Rinse mouth after use. 10.2 g 3   • famotidine (PEPCID) 40 MG tablet Take 1 tablet (40 mg total) by mouth daily at bedtime Take in evening 2 hours prior to bed 90 tablet 3   • lisinopril (ZESTRIL) 30 mg tablet Take 1 tablet (30 mg total) by mouth daily 90 tablet 3   • omeprazole (PriLOSEC) 40 MG capsule Take 1 capsule (40 mg total) by mouth daily Take 30 minutes to one hour before one meal a day 90 capsule 1     No current facility-administered medications for this visit.     MEDICAL HISTORY:  Past Medical History:   Diagnosis Date   • GERD (gastroesophageal reflux disease) Years ago    Has gotten worse last few months   • Hyperlipidemia      Past Surgical History:   Procedure Laterality Date   • HAND EXTENSOR TENDON EXCISION     • KNEE ARTHROSCOPY       Social History     Substance and Sexual Activity   Alcohol Use Never    Comment: reports socially drinks alcohol     Social History     Substance and Sexual Activity   Drug Use Never     Social History     Tobacco Use   Smoking Status Never   Smokeless Tobacco Never     Family History   Problem Relation Age of Onset   • Coronary artery disease Father          Objective  Blood pressure 110/80, pulse 72, resp. rate 18, height 5' 9\" (1.753 m), weight 96.2 kg (212 lb), SpO2 96%. Body mass index is 31.31 " kg/m².    PHYSICAL EXAM:   General Appearance: Alert, cooperative, no distress  HEENT: Normocephalic, atraumatic, anicteric.   Neck: Supple, symmetrical, trachea midline  Lungs: Clear to auscultation bilaterally; no rales, rhonchi or wheezing; respirations unlabored   Heart: Regular rate and rhythm; no murmur, rub, or gallop.  Abdomen: Soft, bowel sounds normal, non-tender, non-distended; no masses, there is no hepatosplenomegaly. No spider angiomas.  Small reducible umbilical hernia.  No stigmata of chronic liver disease.  Genitalia: Deferred   Rectal: Deferred   Extremities: No cyanosis, clubbing or edema   Skin: No jaundice, rashes, or lesions   Lymph nodes: No palpable cervical lymphadenopathy   Lab Results:     Radiology Results:   No results found.  Kyle Elizabeth, DO   02/27/24   Cc:    Kyle Elizabeth,   2/24/2024  8:43 AM  Sign when Signing Visit  [facility]  Gastroenterology Outpatient Follow up  Patient Audie Goode   Age 49 y.o.   Gender male   MRN: [id number]  [facility] [id number]     ASSESSMENT AND PLAN:   Problem List Items Addressed This Visit    None     _____________________________________________________________    HPI: Please see complete note in Epic    12/11/2023 pulmonary follow-up  Exercise-induced asthma-Symbicort was added.  Chronic cough-worse with exercise  GERD    9/27/2023 EGD normal duodenum.  Mild erythema in the antrum.  Biopsies negative for H. pylori.  Mildly prominent fold in the prepyloric region and 11 o'clock position.  Biopsy obtained.  Biopsy unremarkable.  4 mm polyp in the esophagus at 24 cm.  Biopsy unremarkable.  Irregular Z-line at 40 cm    Repeat EGD 1 year recommended    9/27/2023 colonoscopy  Normal terminal ileum  Two 4 mm sessile polyps in the ascending colon removed with cold snare.  Tubular adenoma  One 4 mm sessile polyp in the sigmoid colon 40 cm from the anal verge removed with cold snare.  Tubular adenoma  One pedunculated,  adenomatous-appearing Ml Ip polyp measuring 10 mm at 18 cm from the anal verge removed with cold snare.  Hyperplastic polyp.    Diverticula of moderate severity in the sigmoid colon  Small external hemorrhoids  Colonic mucosa otherwise appeared normal    Repeat colonoscopy in 3 years recommended    9/21/2023 laboratory data  Hepatitis a antibody total-reactive  Hepatitis B surface antigen-nonreactive  Hepatitis B surface antibody-less than 3  Hepatitis B core antibody total nonreactive    Sodium 138  Potassium 4.4  BUN 36  Creatinine 1.06  AST 28  ALT 58  Alk phos 51  Albumin 4.9  T. bili 0.68  Cholesterol 236  Triglycerides 288    Ferritin 385  Iron saturation 33%  TIBC 294  WBC 5.60 Hgb 16.5 HCT 47.6 MCV 89 platelet count 276,000    8/31/2023 ultrasound elastography  F0 to F1  S3    5/10/2023 laboratory data  BUN was 28 creatinine 1.03  AST was 24 ALT 69  Albumin 4.5  T. bili 0.77  Cholesterol was 209  Triglycerides 174  HDL 54    Last CBC was 4/10/2022 and was normal  TSH 1.32  Hep C antibody nonreactive     8/15/2018 CT scan of the abdomen pelvis done for renal stone study  Faint tree-in-bud opacities at the base of the right middle lobe.  Hepatic steatosis  Spleen, pancreas, adrenal glands and kidneys are suboptimally evaluated on a nonenhanced image but demonstrate no acute pathology.  4 mm stone left ureter and mild left hydronephrosis  Small fat-containing umbilical hernia    No Known Allergies  Current Outpatient Medications   Medication Sig Dispense Refill   • albuterol (Ventolin HFA) 90 mcg/act inhaler Inhale 2 puffs every 6 (six) hours as needed for wheezing 18 g 2   • budesonide-formoterol (Symbicort) 80-4.5 MCG/ACT inhaler Inhale 2 puffs 2 (two) times a day Rinse mouth after use. 10.2 g 3   • famotidine (PEPCID) 40 MG tablet Take 1 tablet (40 mg total) by mouth daily at bedtime Take in evening 2 hours prior to bed 90 tablet 3   • lisinopril (ZESTRIL) 30 mg tablet Take 1 tablet (30 mg  total) by mouth daily 90 tablet 3   • omeprazole (PriLOSEC) 40 MG capsule Take 1 capsule (40 mg total) by mouth daily Take 30 minutes to one hour before one meal a day 90 capsule 1     No current facility-administered medications for this visit.     MEDICAL HISTORY:  Past Medical History:   Diagnosis Date   • GERD (gastroesophageal reflux disease) Years ago    Has gotten worse last few months   • Hyperlipidemia      Past Surgical History:   Procedure Laterality Date   • HAND EXTENSOR TENDON EXCISION     • KNEE ARTHROSCOPY       Social History     Substance and Sexual Activity   Alcohol Use Never    Comment: reports socially drinks alcohol     Social History     Substance and Sexual Activity   Drug Use Never     Social History     Tobacco Use   Smoking Status Never   Smokeless Tobacco Never     Family History   Problem Relation Age of Onset   • Coronary artery disease Father        REVIEW OF SYSTEMS:  CONSTITUTIONAL: Denies any fever, chills, rigors, and weight loss.  HEENT: No earache or tinnitus. Denies hearing loss or visual disturbances.  CARDIOVASCULAR: No chest pain or palpitations.   RESPIRATORY: Denies any cough, hemoptysis, shortness of breath or dyspnea on exertion.  GASTROINTESTINAL: As noted in the History of Present Illness.   GENITOURINARY: No problems with urination. Denies any hematuria or dysuria.  NEUROLOGIC: No dizziness or vertigo, denies headaches.   MUSCULOSKELETAL: Denies any muscle or joint pain.   SKIN: Denies skin rashes or itching.   ENDOCRINE: Denies excessive thirst. Denies intolerance to heat or cold.  PSYCHOSOCIAL: Denies depression or anxiety. Denies any recent memory loss.     Objective  There were no vitals taken for this visit. There is no height or weight on file to calculate BMI.    PHYSICAL EXAM:   General Appearance: Alert, cooperative, no distress  HEENT: Normocephalic, atraumatic, anicteric.   Neck: Supple, symmetrical, trachea midline  Lungs: Clear to auscultation  bilaterally; no rales, rhonchi or wheezing; respirations unlabored   Heart: Regular rate and rhythm; no murmur, rub, or gallop.  Abdomen: Soft, bowel sounds normal, non-tender, non-distended; no masses, there is no hepatosplenomegaly. No spider angiomas  Genitalia: Deferred   Rectal: Deferred   Extremities: No cyanosis, clubbing or edema   Skin: No jaundice, rashes, or lesions   Lymph nodes: No palpable cervical lymphadenopathy   Lab Results:   No visits with results within 2 Month(s) from this visit.   Latest known visit with results is:   Hospital Outpatient Visit on 09/27/2023   Component Date Value   • Case Report 09/27/2023                      Value:Surgical Pathology Report                         Case: [id number]                                   Authorizing Provider:  Kyle Elizabeth DO  Collected:           09/27/2023 1216              Ordering Location:     [facility] [place] Received:            09/27/2023 1434                                     Endoscopy                                                                    Pathologist:           Zach Cosme MD                                                    Specimens:   A) - Stomach, ANTRUM BX, R/O H PYLORI                                                               B) - Stomach, PROMIENT FOLD BX                                                                      C) - Esophagus, POLYP AT 24 CM                                                                      D) - Large Intestine, Right/Ascending Colon, POLYP X2                                               E) - Large Intestine, Sigmoid Colon, POLYP AT 40 CM                                                                           F) - Colon, POLYP AT 18 CM                                                                • Final Diagnosis 09/27/2023                      Value:This result contains rich text formatting which cannot be displayed here.   • Additional Information  09/27/2023                      Value:This result contains rich text formatting which cannot be displayed here.   • Synoptic Checklist 09/27/2023                      Value:                            COLON/RECTUM POLYP FORM - GI - All Specimens                                                                                     :    Adenoma(s)     • Gross Description 09/27/2023                      Value:This result contains rich text formatting which cannot be displayed here.     Radiology Results:   No results found.  Kyle Elizabeth,    02/24/24   Cc:

## 2024-02-27 NOTE — PATIENT INSTRUCTIONS
GERD (gastroesophageal reflux disease)  Audie symptoms are greatly improved.  He has been experiencing coughing with lying down and with minimal exertion.  No evidence of Whittaker's esophagus or erosive esophagitis on medication at time of EGD.  Continue omeprazole 40 mg once a day, best to take 30 minutes to 1 hour before 1 meal a day.  He may stop using the famotidine 40 mg 2 hours before bed.  However if he notices increased in symptoms he should resume the famotidine (Pepcid).  Lifestyle modifications for gastroesophageal reflux disease were discussed and include limiting fried and fatty foods, mints, chocolates, carbonated and caffeinated beverages , and alcohol, etc.  Avoid lying down for 2-3 hours after meals.  If you have nighttime symptoms consider raising the head of the bed up on 4-6 inch blocks.  Pillows typically are not useful.  If you are overweight, weight loss will be helpful.      History of colon polyps  Audie was noted to have colon polyps at the time of his initial colonoscopy performed on 9/27/2023.  Based upon findings at time of the colonoscopy he will need a surveillance colonoscopy in September 2026.      Fatty liver  Audie was noted to have fatty infiltration liver on imaging studies.  He did have an ultrasound elastography that revealed S3 but fortunately no evidence of scarring in the liver.  I did discuss with him that there are no FDA approved medications for fatty infiltration of liver.  The primary treatment at this point in time would be weight reduction with a goal of losing 7 to 10% of his body weight over the next 12 months.  Treatment of hyperlipidemia, he can discuss this further with his primary care provider.  Consider vaccination for  hepatitis B.  He is immune to hepatitis A.    Abnormal findings on esophagogastroduodenoscopy (EGD)  Audie was noted to have a mildly prominent fold in the prepyloric region at time of EGD on September 27, 2023.  Biopsies unremarkable.  Did  report recommend repeating upper endoscopy in 1 year thus this will be due around September or October 2024.  I will see him in the office in follow-up in August or early September and make arrangements for that procedure at that time.    Elevated ferritin  Audie was noted to have mildly elevated ferritin in his laboratory testing.  Would recommend repeating iron studies when he has his next blood draw.

## 2024-02-27 NOTE — ASSESSMENT & PLAN NOTE
Audie was noted to have a mildly prominent fold in the prepyloric region at time of EGD on September 27, 2023.  Biopsies unremarkable.  Did report recommend repeating upper endoscopy in 1 year thus this will be due around September or October 2024.  I will see him in the office in follow-up in August or early September and make arrangements for that procedure at that time.

## 2024-02-27 NOTE — ASSESSMENT & PLAN NOTE
Audie was noted to have mildly elevated ferritin in his laboratory testing.  Would recommend repeating iron studies when he has his next blood draw.

## 2024-08-13 DIAGNOSIS — K21.9 GASTROESOPHAGEAL REFLUX DISEASE WITHOUT ESOPHAGITIS: ICD-10-CM

## 2024-08-13 RX ORDER — OMEPRAZOLE 40 MG/1
40 CAPSULE, DELAYED RELEASE ORAL DAILY
Qty: 100 CAPSULE | Refills: 1 | Status: SHIPPED | OUTPATIENT
Start: 2024-08-13

## 2024-09-03 ENCOUNTER — DOCUMENTATION (OUTPATIENT)
Dept: GASTROENTEROLOGY | Facility: CLINIC | Age: 49
End: 2024-09-03

## 2024-09-03 NOTE — PROGRESS NOTES
Records reviewed and outlined below in preparation for office visit 9/5/2024;    12/11/2023 pulmonary follow-up  Exercise-induced asthma-Symbicort was added.  Chronic cough-worse with exercise  GERD     9/27/2023 EGD normal duodenum.  Mild erythema in the antrum.  Biopsies negative for H. pylori.  Mildly prominent fold in the prepyloric region in the11 o'clock position.  Biopsy obtained.  Biopsy unremarkable.  4 mm polyp in the esophagus at 24 cm.  Biopsy unremarkable.  Z-line regular at 40 cm     Repeat EGD 1 year recommended     9/27/2023 colonoscopy  Normal terminal ileum  Two 4 mm sessile polyps in the ascending colon removed with cold snare.  Tubular adenoma  One 4 mm sessile polyp in the sigmoid colon 40 cm from the anal verge removed with cold snare.  Tubular adenoma  One pedunculated, adenomatous-appearing Ml Ip polyp measuring 10 mm at 18 cm from the anal verge removed with cold snare.  Hyperplastic polyp.    Diverticula of moderate severity in the sigmoid colon  Small external hemorrhoids  Colonic mucosa otherwise appeared normal     Repeat colonoscopy in 3 years recommended     9/21/2023 laboratory data  Hepatitis a antibody total-reactive  Hepatitis B surface antigen-nonreactive  Hepatitis B surface antibody-less than 3  Hepatitis B core antibody total nonreactive     Sodium 138  Potassium 4.4  BUN 36  Creatinine 1.06  AST 28  ALT 58  Alk phos 51  Albumin 4.9  T. bili 0.68  Cholesterol 236  Triglycerides 288    Ferritin 385  Iron saturation 33%  TIBC 294  WBC 5.60 Hgb 16.5 HCT 47.6 MCV 89 platelet count 276,000     8/31/2023 ultrasound elastography  F0 to F1  S3     5/10/2023 laboratory data  BUN was 28 creatinine 1.03  AST was 24 ALT 69  Albumin 4.5  T. bili 0.77  Cholesterol was 209  Triglycerides 174  HDL 54    Last CBC was 4/10/2022 and was normal  TSH 1.32  Hep C antibody nonreactive     8/15/2018 CT scan of the abdomen pelvis done for renal stone study  Faint tree-in-bud opacities  at the base of the right middle lobe.  Hepatic steatosis  Spleen, pancreas, adrenal glands and kidneys are suboptimally evaluated on a nonenhanced image but demonstrate no acute pathology.  4 mm stone left ureter and mild left hydronephrosis  Small fat-containing umbilical hernia

## 2024-09-03 NOTE — PROGRESS NOTES
[facility] Gastroenterology  Gastroenterology Outpatient Follow up  Patient Audie Goode   Age 49 y.o.   Gender male   MRN: [id number]  [facility] [id number]     ASSESSMENT AND PLAN:   Problem List Items Addressed This Visit          Digestive    GERD (gastroesophageal reflux disease) - Primary     Audie reports that he is having less cough now that he is on omeprazole 40 mg daily during the day and famotidine in the evening.  He did noticed increased nighttime symptoms when he stopped the famotidine.  He has now resume famotidine 40 mg in the evening.  Continue omeprazole 40 mg during the day.  Lifestyle modifications for gastroesophageal reflux disease were discussed and include limiting fried and fatty foods, mints, chocolates, carbonated and caffeinated beverages , and alcohol, etc.  Avoid lying down for 2-3 hours after meals.  If you have nighttime symptoms consider raising the head of the bed up on 4-6 inch blocks.  Pillows typically are not useful.  If you are overweight, weight loss will be helpful.             Relevant Orders    EGD    Metabolic dysfunction-associated steatotic liver disease (MASLD)     Audie does have fatty infiltration of liver on imaging studies.  Elastography revealed S3 and F2 0 to F1 fibrosis.  Primary treatment for him is weight reduction.  He also has hyperlipidemia and tight control of hyperlipidemia will also help with his fatty liver.  Losing 7 to 10% of his body weight would be very helpful.  We will continue to monitor this.  We could consider repeating an elastography in around August 2025.         Relevant Orders    CBC    Comprehensive metabolic panel       Other    History of colon polyps     Audie was noted to have colon polyps at the time of his initial colonoscopy performed on 9/27/2023.  Based upon findings at time of the colonoscopy he will need a surveillance colonoscopy in September 2026.           Abnormal findings on esophagogastroduodenoscopy (EGD)     Audie was noted  to have a mildly prominent fold in the prepyloric region at time of EGD on September 27, 2023.  Biopsies unremarkable.  Did report recommend repeating upper endoscopy in 1 year.  Audie will be scheduled for upper endoscopy to follow-up on this prominent fold in the prepyloric region.  I explained to the patient the procedure of upper endoscopy as well as its potential risk which are approximately 1 in 1000 chance of bleeding, infection, and perforation.           Relevant Orders    EGD    Elevated ferritin     Audie was noted to have an elevated ferritin and his laboratory testing.  I do not see a repeat ferritin performed.  I will be repeating his iron studies in the near future.  If ferritin remains elevated, check HFE gene.         Relevant Orders    Iron Panel (Includes Ferritin, Iron Sat%, Iron, and TIBC)    Umbilical hernia without obstruction and without gangrene     Audie does have an umbilical hernia.  This is essentially asymptomatic.  Appears to be about medium sized.  He is interested in seeking consultation with a surgeon to learn more about the natural history of these and whether or not he should consider surgery.  I will place a surgical consultation.         Relevant Orders    Ambulatory Referral to General Surgery     Other Visit Diagnoses       Medication management        Relevant Orders    CBC    Comprehensive metabolic panel    Magnesium    Vitamin B12    Folate           _____________________________________________________________    HPI: Audie is a delightful 49-year-old gentleman whom seen in the office in follow-up for atypical GERD symptom of cough, fatty liver, history of colon polyps.  He reports since using inhalers and omeprazole, and famotidine in the evening.  He is not experiencing much coughing whatsoever.  He used to have coughing spells with exercise and with lying down, this is significantly improved.  He tries to avoid lying down soon after eating.  Denies any heartburn or  acid reflux symptoms.  Denies any dysphagia nausea or vomiting.  He does admit he is not been exercising as much as he would like to but will get back into it.    His bowel habits are very regular, denies any diarrhea, constipation, bleeding or black stools.    Records reviewed for 10 minutes prior to office visit    12/11/2023 pulmonary follow-up  Exercise-induced asthma-Symbicort was added.  Chronic cough-worse with exercise  GERD     9/27/2023 EGD normal duodenum.  Mild erythema in the antrum.  Biopsies negative for H. pylori.  Mildly prominent fold in the prepyloric region in the11 o'clock position.  Biopsy obtained.  Biopsy unremarkable.  4 mm polyp in the esophagus at 24 cm.  Biopsy unremarkable.  Z-line regular at 40 cm     Repeat EGD 1 year recommended     9/27/2023 colonoscopy  Normal terminal ileum  Two 4 mm sessile polyps in the ascending colon removed with cold snare.  Tubular adenoma  One 4 mm sessile polyp in the sigmoid colon 40 cm from the anal verge removed with cold snare.  Tubular adenoma  One pedunculated, adenomatous-appearing Ml Ip polyp measuring 10 mm at 18 cm from the anal verge removed with cold snare.  Hyperplastic polyp.    Diverticula of moderate severity in the sigmoid colon  Small external hemorrhoids  Colonic mucosa otherwise appeared normal     Repeat colonoscopy in 3 years recommended     9/21/2023 laboratory data  Hepatitis a antibody total-reactive  Hepatitis B surface antigen-nonreactive  Hepatitis B surface antibody-less than 3  Hepatitis B core antibody total nonreactive     Sodium 138  Potassium 4.4  BUN 36  Creatinine 1.06  AST 28  ALT 58  Alk phos 51  Albumin 4.9  T. bili 0.68  Cholesterol 236  Triglycerides 288    Ferritin 385  Iron saturation 33%  TIBC 294  WBC 5.60 Hgb 16.5 HCT 47.6 MCV 89 platelet count 276,000     8/31/2023 ultrasound elastography  F0 to F1  S3     5/10/2023 laboratory data  BUN was 28 creatinine 1.03  AST was 24 ALT 69  Albumin 4.5  T. bili  0.77  Cholesterol was 209  Triglycerides 174  HDL 54    Last CBC was 4/10/2022 and was normal  TSH 1.32  Hep C antibody nonreactive     8/15/2018 CT scan of the abdomen pelvis done for renal stone study  Faint tree-in-bud opacities at the base of the right middle lobe.  Hepatic steatosis  Spleen, pancreas, adrenal glands and kidneys are suboptimally evaluated on a nonenhanced image but demonstrate no acute pathology.  4 mm stone left ureter and mild left hydronephrosis  Small fat-containing umbilical hernia       No Known Allergies  Current Outpatient Medications   Medication Sig Dispense Refill    albuterol (Ventolin HFA) 90 mcg/act inhaler Inhale 2 puffs every 6 (six) hours as needed for wheezing 18 g 2    budesonide-formoterol (Symbicort) 80-4.5 MCG/ACT inhaler Inhale 2 puffs 2 (two) times a day Rinse mouth after use. 10.2 g 3    famotidine (PEPCID) 40 MG tablet Take 1 tablet (40 mg total) by mouth daily at bedtime Take in evening 2 hours prior to bed 90 tablet 3    lisinopril (ZESTRIL) 30 mg tablet Take 1 tablet (30 mg total) by mouth daily 90 tablet 3    omeprazole (PriLOSEC) 40 MG capsule take 1 capsule by mouth once daily 100 capsule 1     No current facility-administered medications for this visit.     MEDICAL HISTORY:  Past Medical History:   Diagnosis Date    GERD (gastroesophageal reflux disease) Years ago    Has gotten worse last few months    Hyperlipidemia      Past Surgical History:   Procedure Laterality Date    HAND EXTENSOR TENDON EXCISION      KNEE ARTHROSCOPY       Social History     Substance and Sexual Activity   Alcohol Use Never    Comment: reports socially drinks alcohol     Social History     Substance and Sexual Activity   Drug Use Never     Social History     Tobacco Use   Smoking Status Never   Smokeless Tobacco Never     Family History   Problem Relation Age of Onset    Coronary artery disease Father        Objective   Blood pressure 130/74, pulse 75, temperature 98.4 °F (36.9  "°C), resp. rate 20, height 5' 9\" (1.753 m), weight 97.7 kg (215 lb 6.4 oz), SpO2 98%. Body mass index is 31.81 kg/m².    PHYSICAL EXAM:   General Appearance: Alert, cooperative, no distress  HEENT: Normocephalic, atraumatic, anicteric.   Neck: Supple, symmetrical, trachea midline  Lungs: Clear to auscultation bilaterally; no rales, rhonchi or wheezing; respirations unlabored   Heart: Regular rate and rhythm; no murmur, rub, or gallop.  Abdomen: Soft, bowel sounds normal, non-tender, non-distended; no masses, there is no hepatosplenomegaly. No spider angiomas.  Medium sized umbilical hernia  Genitalia: Deferred   Rectal: Deferred   Extremities: No cyanosis, clubbing or edema   Skin: No jaundice, rashes, or lesions   Lymph nodes: No palpable cervical lymphadenopathy   Lab Results:   No visits with results within 2 Month(s) from this visit.   Latest known visit with results is:   Hospital Outpatient Visit on 09/27/2023   Component Date Value    Case Report 09/27/2023                      Value:Surgical Pathology Report                         Case: [id number]                                   Authorizing Provider:  Kyle Elizabeth DO  Collected:           09/27/2023 1216              Ordering Location:     [facility] Received:            09/27/2023 1434                                     Endoscopy                                                                    Pathologist:           Zach Cosme MD                                                    Specimens:   A) - Stomach, ANTRUM BX, R/O H PYLORI                                                               B) - Stomach, PROMIENT FOLD BX                                                                      C) - Esophagus, POLYP AT 24 CM                                                                      D) - Large Intestine, Right/Ascending Colon, POLYP X2                                               E) - Large Intestine, Sigmoid " Colon, POLYP AT 40 CM                                                                           F) - Colon, POLYP AT 18 CM                                                                 Final Diagnosis 09/27/2023                      Value:A. Stomach, antrum, biopsy:                          - Fragments of gastric antral and oxyntic mucosa with mild chronic                           nonspecific inflammation.                          - No Helicobacter pylori organisms are identified with routine H&E stain.                          - Negative for intestinal metaplasia, dysplasia or carcinoma.                                                    B. Stomach, biopsy prominent fold:                          - Gastric antral mucosa with morphologic features compatible with reactive                           gastropathy.                          - No Helicobacter pylori organisms are identified with routine H&E stain.                          - Negative for intestinal metaplasia, dysplasia or carcinoma.                                                    C. Esophagus, polyp at 24 cm:                          - Benign squamous mucosa without significant histopathologic changes.                          - There is no increase in the number of intraepithelial eosinophils to                           suggest the presence of eosinophilic esophagitis.                                                      D. Large Intestine, Right/Ascending Colon, polyp x 2:                          - Tubular adenoma x 2.                          - Negative for high grade dysplasia/ carcinoma.                                                    E. Large Intestine, Sigmoid Colon, polyp at 40 CM:                          - Tubular adenoma.                          - Negative for high grade dysplasia/ carcinoma.                                                    F. Colon, polyp at 18 CM:                          - Fragments of hyperplastic polyp.                            - Negative for dysplasia/ carcinoma.    Additional Information 09/27/2023                      Value:All reported additional testing was performed with appropriately reactive                           controls.  These tests were developed and their performance                           characteristics determined by [facility] or                           appropriate performing facility, though some tests may be performed on                           tissues which have not been validated for performance characteristics                           (such as staining performed on alcohol exposed cell blocks and decalcified                           tissues).  Results should be interpreted with caution and in the context                           of the patients’ clinical condition. These tests may not be cleared or                           approved by the U.S. Food and Drug Administration, though the FDA has                           determined that such clearance or approval is not necessary. These tests                           are used for clinical purposes and they should not be regarded as                           investigational or for research. This laboratory has been approved by CLIA                           88, designated as a high-complexity laboratory and is qualified to perform                           these tests.                                                    Interpretation performed at [facility], [address] [place] [id number]    Synoptic Checklist 09/27/2023                      Value:                            COLON/RECTUM POLYP FORM - GI - All Specimens                                                                                     :    Adenoma(s)      Gross Description 09/27/2023                      Value:A. The specimen is received in formalin, labeled with the patient's name                            "and hospital number, and is designated \" stomach antrum\".  The specimen                           consists of 2 tan soft tissue fragments measuring 0.2 and 0.3 cm in                           greatest dimension.  Entirely submitted. One screened cassette.                          B. The specimen is received in formalin, labeled with the patient's name                           and hospital number, and is designated \" stomach-prominent fold\".  The                           specimen consists of 1 tan soft tissue fragment measuring 0.4 cm in                           greatest dimension.  Entirely submitted. One screened cassette.                          C. The specimen is received in formalin, labeled with the patient's name                           and hospital number, and is designated \" esophagus polyp at 24 cm\".  The                           specimen consists of 1 colorless soft tissue fragment measuring 0.3 cm in                           greatest dimension.  Entirely submitted. One screened cassette.                          D. The specimen is received in formalin, labeled with the patient's name                           and hospital number, and is designated \" right/ascending colon polyp x 2\".                            The specimen consists of 2 tan soft tissue fragments measuring 0.5 and                           0.8 cm in greatest dimension.  Entirely submitted. One screened cassette.                          E. The specimen is received in formalin, labeled with the patient's name                           and hospital number, and is designated \" sigmoid colon polyp at 40 cm\".                            The specimen consists of 1 tan soft tissue fragment measuring 0.4 cm in                           greatest dimension.  Entirely submitted. One screened cassette.                          F. The specimen is received in formalin, labeled with the patient's name                           and hospital number, " "and is designated \" polyp at 18 cm\".  The specimen                           consists of 1 tan soft tissue fragment measuring 0.5 cm in greatest                           dimension.  Entirely submitted. One screened cassette.                                                    Note: The estimated total formalin fixation time based upon information                           provided by the submitting clinician and the standard processing schedule                           is under 72 hours.                                                                              [facility]     Radiology Results:   No results found.  Kyle Elizabeth, DO   09/05/24   Cc:  "

## 2024-09-05 ENCOUNTER — OFFICE VISIT (OUTPATIENT)
Dept: GASTROENTEROLOGY | Facility: CLINIC | Age: 49
End: 2024-09-05
Payer: COMMERCIAL

## 2024-09-05 VITALS
HEART RATE: 75 BPM | BODY MASS INDEX: 31.9 KG/M2 | HEIGHT: 69 IN | DIASTOLIC BLOOD PRESSURE: 74 MMHG | WEIGHT: 215.4 LBS | RESPIRATION RATE: 20 BRPM | SYSTOLIC BLOOD PRESSURE: 130 MMHG | OXYGEN SATURATION: 98 % | TEMPERATURE: 98.4 F

## 2024-09-05 DIAGNOSIS — K76.0 METABOLIC DYSFUNCTION-ASSOCIATED STEATOTIC LIVER DISEASE (MASLD): ICD-10-CM

## 2024-09-05 DIAGNOSIS — Z79.899 MEDICATION MANAGEMENT: ICD-10-CM

## 2024-09-05 DIAGNOSIS — R19.8 ABNORMAL FINDINGS ON ESOPHAGOGASTRODUODENOSCOPY (EGD): ICD-10-CM

## 2024-09-05 DIAGNOSIS — R79.89 ELEVATED FERRITIN: ICD-10-CM

## 2024-09-05 DIAGNOSIS — Z86.010 HISTORY OF COLON POLYPS: ICD-10-CM

## 2024-09-05 DIAGNOSIS — K42.9 UMBILICAL HERNIA WITHOUT OBSTRUCTION AND WITHOUT GANGRENE: ICD-10-CM

## 2024-09-05 DIAGNOSIS — K21.9 GASTROESOPHAGEAL REFLUX DISEASE WITHOUT ESOPHAGITIS: Primary | ICD-10-CM

## 2024-09-05 PROCEDURE — 99214 OFFICE O/P EST MOD 30 MIN: CPT | Performed by: INTERNAL MEDICINE

## 2024-09-05 NOTE — ASSESSMENT & PLAN NOTE
Audie was noted to have an elevated ferritin and his laboratory testing.  I do not see a repeat ferritin performed.  I will be repeating his iron studies in the near future.  If ferritin remains elevated, check HFE gene.

## 2024-09-05 NOTE — ASSESSMENT & PLAN NOTE
Audie was noted to have a mildly prominent fold in the prepyloric region at time of EGD on September 27, 2023.  Biopsies unremarkable.  Did report recommend repeating upper endoscopy in 1 year.  Audie will be scheduled for upper endoscopy to follow-up on this prominent fold in the prepyloric region.  I explained to the patient the procedure of upper endoscopy as well as its potential risk which are approximately 1 in 1000 chance of bleeding, infection, and perforation.

## 2024-09-05 NOTE — ASSESSMENT & PLAN NOTE
Audie does have an umbilical hernia.  This is essentially asymptomatic.  Appears to be about medium sized.  He is interested in seeking consultation with a surgeon to learn more about the natural history of these and whether or not he should consider surgery.  I will place a surgical consultation.

## 2024-09-05 NOTE — PATIENT INSTRUCTIONS
GERD (gastroesophageal reflux disease)  Audie reports that he is having less cough now that he is on omeprazole 40 mg daily during the day and famotidine in the evening.  He did noticed increased nighttime symptoms when he stopped the famotidine.  He has now resume famotidine 40 mg in the evening.  Continue omeprazole 40 mg during the day.  Lifestyle modifications for gastroesophageal reflux disease were discussed and include limiting fried and fatty foods, mints, chocolates, carbonated and caffeinated beverages , and alcohol, etc.  Avoid lying down for 2-3 hours after meals.  If you have nighttime symptoms consider raising the head of the bed up on 4-6 inch blocks.  Pillows typically are not useful.  If you are overweight, weight loss will be helpful.        Abnormal findings on esophagogastroduodenoscopy (EGD)  Audie was noted to have a mildly prominent fold in the prepyloric region at time of EGD on September 27, 2023.  Biopsies unremarkable.  Did report recommend repeating upper endoscopy in 1 year.  Audie will be scheduled for upper endoscopy to follow-up on this prominent fold in the prepyloric region.  I explained to the patient the procedure of upper endoscopy as well as its potential risk which are approximately 1 in 1000 chance of bleeding, infection, and perforation.      History of colon polyps  Audie was noted to have colon polyps at the time of his initial colonoscopy performed on 9/27/2023.  Based upon findings at time of the colonoscopy he will need a surveillance colonoscopy in September 2026.      Metabolic dysfunction-associated steatotic liver disease (MASLD)  Audie does have fatty infiltration of liver on imaging studies.  Elastography revealed S3 and F2 0 to F1 fibrosis.  Primary treatment for him is weight reduction.  He also has hyperlipidemia and tight control of hyperlipidemia will also help with his fatty liver.  Losing 7 to 10% of his body weight would be very helpful.  We will continue  to monitor this.  We could consider repeating an elastography in around August 2025.    Elevated ferritin  Audie was noted to have an elevated ferritin and his laboratory testing.  I do not see a repeat ferritin performed.  I will be repeating his iron studies in the near future.  If ferritin remains elevated, check HFE gene.    Umbilical hernia without obstruction and without gangrene  Audie does have an umbilical hernia.  This is essentially asymptomatic.  Appears to be about medium sized.  He is interested in seeking consultation with a surgeon to learn more about the natural history of these and whether or not he should consider surgery.  I will place a surgical consultation.

## 2024-09-05 NOTE — ASSESSMENT & PLAN NOTE
Audie does have fatty infiltration of liver on imaging studies.  Elastography revealed S3 and F2 0 to F1 fibrosis.  Primary treatment for him is weight reduction.  He also has hyperlipidemia and tight control of hyperlipidemia will also help with his fatty liver.  Losing 7 to 10% of his body weight would be very helpful.  We will continue to monitor this.  We could consider repeating an elastography in around August 2025.

## 2024-09-05 NOTE — ASSESSMENT & PLAN NOTE
Audie reports that he is having less cough now that he is on omeprazole 40 mg daily during the day and famotidine in the evening.  He did noticed increased nighttime symptoms when he stopped the famotidine.  He has now resume famotidine 40 mg in the evening.  Continue omeprazole 40 mg during the day.  Lifestyle modifications for gastroesophageal reflux disease were discussed and include limiting fried and fatty foods, mints, chocolates, carbonated and caffeinated beverages , and alcohol, etc.  Avoid lying down for 2-3 hours after meals.  If you have nighttime symptoms consider raising the head of the bed up on 4-6 inch blocks.  Pillows typically are not useful.  If you are overweight, weight loss will be helpful.

## 2024-09-26 DIAGNOSIS — I10 PRIMARY HYPERTENSION: ICD-10-CM

## 2024-09-26 DIAGNOSIS — K21.9 GASTROESOPHAGEAL REFLUX DISEASE WITHOUT ESOPHAGITIS: ICD-10-CM

## 2024-09-27 RX ORDER — FAMOTIDINE 40 MG/1
TABLET, FILM COATED ORAL
Qty: 90 TABLET | Refills: 1 | Status: SHIPPED | OUTPATIENT
Start: 2024-09-27

## 2024-09-27 RX ORDER — LISINOPRIL 30 MG/1
30 TABLET ORAL DAILY
Qty: 30 TABLET | Refills: 0 | Status: SHIPPED | OUTPATIENT
Start: 2024-09-27

## 2024-09-27 NOTE — TELEPHONE ENCOUNTER
Patient needs updated blood work and has previously placed orders. Please contact patient to go for labs. Courtesy refill provided.     Patient is scheduled for an appointment. Courtesy refill provided.

## 2024-10-02 ENCOUNTER — OFFICE VISIT (OUTPATIENT)
Dept: SURGERY | Facility: CLINIC | Age: 49
End: 2024-10-02
Payer: COMMERCIAL

## 2024-10-02 VITALS
TEMPERATURE: 97.6 F | HEIGHT: 69 IN | DIASTOLIC BLOOD PRESSURE: 82 MMHG | BODY MASS INDEX: 31.9 KG/M2 | HEART RATE: 96 BPM | SYSTOLIC BLOOD PRESSURE: 150 MMHG | WEIGHT: 215.4 LBS | OXYGEN SATURATION: 99 % | RESPIRATION RATE: 16 BRPM

## 2024-10-02 DIAGNOSIS — K42.9 UMBILICAL HERNIA WITHOUT OBSTRUCTION AND WITHOUT GANGRENE: Primary | ICD-10-CM

## 2024-10-02 PROCEDURE — 99244 OFF/OP CNSLTJ NEW/EST MOD 40: CPT | Performed by: SURGERY

## 2024-10-02 RX ORDER — SODIUM CHLORIDE, SODIUM LACTATE, POTASSIUM CHLORIDE, CALCIUM CHLORIDE 600; 310; 30; 20 MG/100ML; MG/100ML; MG/100ML; MG/100ML
125 INJECTION, SOLUTION INTRAVENOUS CONTINUOUS
Status: CANCELLED | OUTPATIENT
Start: 2024-11-25

## 2024-10-02 RX ORDER — HEPARIN SODIUM 5000 [USP'U]/ML
5000 INJECTION, SOLUTION INTRAVENOUS; SUBCUTANEOUS ONCE
Status: CANCELLED | OUTPATIENT
Start: 2024-11-25 | End: 2024-10-02

## 2024-10-02 RX ORDER — CEFAZOLIN SODIUM 2 G/50ML
2000 SOLUTION INTRAVENOUS ONCE
Status: CANCELLED | OUTPATIENT
Start: 2024-11-25 | End: 2024-10-02

## 2024-10-07 DIAGNOSIS — J45.990 EXERCISE-INDUCED ASTHMA: ICD-10-CM

## 2024-10-07 RX ORDER — BUDESONIDE AND FORMOTEROL FUMARATE DIHYDRATE 80; 4.5 UG/1; UG/1
2 AEROSOL RESPIRATORY (INHALATION) 2 TIMES DAILY
Qty: 10.3 G | Refills: 5 | Status: SHIPPED | OUTPATIENT
Start: 2024-10-07

## 2024-10-09 ENCOUNTER — LAB (OUTPATIENT)
Dept: LAB | Facility: CLINIC | Age: 49
End: 2024-10-09
Payer: COMMERCIAL

## 2024-10-09 DIAGNOSIS — K21.9 GASTROESOPHAGEAL REFLUX DISEASE WITHOUT ESOPHAGITIS: ICD-10-CM

## 2024-10-09 DIAGNOSIS — K76.0 METABOLIC DYSFUNCTION-ASSOCIATED STEATOTIC LIVER DISEASE (MASLD): ICD-10-CM

## 2024-10-09 DIAGNOSIS — Z79.899 MEDICATION MANAGEMENT: ICD-10-CM

## 2024-10-09 DIAGNOSIS — R79.89 ELEVATED FERRITIN: ICD-10-CM

## 2024-10-09 DIAGNOSIS — Z83.49 FAMILY HISTORY OF THYROID DISEASE IN FATHER: ICD-10-CM

## 2024-10-09 DIAGNOSIS — E78.2 MIXED HYPERLIPIDEMIA: ICD-10-CM

## 2024-10-09 DIAGNOSIS — K42.9 UMBILICAL HERNIA WITHOUT OBSTRUCTION AND WITHOUT GANGRENE: ICD-10-CM

## 2024-10-09 DIAGNOSIS — I10 PRIMARY HYPERTENSION: ICD-10-CM

## 2024-10-09 LAB
ALBUMIN SERPL BCG-MCNC: 4.8 G/DL (ref 3.5–5)
ALP SERPL-CCNC: 45 U/L (ref 34–104)
ALT SERPL W P-5'-P-CCNC: 71 U/L (ref 7–52)
ANION GAP SERPL CALCULATED.3IONS-SCNC: 11 MMOL/L (ref 4–13)
AST SERPL W P-5'-P-CCNC: 32 U/L (ref 13–39)
BASOPHILS # BLD AUTO: 0.06 THOUSANDS/ΜL (ref 0–0.1)
BASOPHILS NFR BLD AUTO: 1 % (ref 0–1)
BILIRUB SERPL-MCNC: 0.47 MG/DL (ref 0.2–1)
BUN SERPL-MCNC: 28 MG/DL (ref 5–25)
CALCIUM SERPL-MCNC: 9.5 MG/DL (ref 8.4–10.2)
CHLORIDE SERPL-SCNC: 101 MMOL/L (ref 96–108)
CHOLEST SERPL-MCNC: 250 MG/DL
CO2 SERPL-SCNC: 26 MMOL/L (ref 21–32)
CREAT SERPL-MCNC: 0.97 MG/DL (ref 0.6–1.3)
EOSINOPHIL # BLD AUTO: 0.1 THOUSAND/ΜL (ref 0–0.61)
EOSINOPHIL NFR BLD AUTO: 2 % (ref 0–6)
ERYTHROCYTE [DISTWIDTH] IN BLOOD BY AUTOMATED COUNT: 11.9 % (ref 11.6–15.1)
FERRITIN SERPL-MCNC: 368 NG/ML (ref 24–336)
FOLATE SERPL-MCNC: 7.8 NG/ML
GFR SERPL CREATININE-BSD FRML MDRD: 90 ML/MIN/1.73SQ M
GLUCOSE P FAST SERPL-MCNC: 92 MG/DL (ref 65–99)
HCT VFR BLD AUTO: 45.8 % (ref 36.5–49.3)
HDLC SERPL-MCNC: 46 MG/DL
HGB BLD-MCNC: 15.4 G/DL (ref 12–17)
IMM GRANULOCYTES # BLD AUTO: 0.03 THOUSAND/UL (ref 0–0.2)
IMM GRANULOCYTES NFR BLD AUTO: 1 % (ref 0–2)
IRON SATN MFR SERPL: 29 % (ref 15–50)
IRON SERPL-MCNC: 86 UG/DL (ref 50–212)
LYMPHOCYTES # BLD AUTO: 1.62 THOUSANDS/ΜL (ref 0.6–4.47)
LYMPHOCYTES NFR BLD AUTO: 27 % (ref 14–44)
MAGNESIUM SERPL-MCNC: 2.2 MG/DL (ref 1.9–2.7)
MCH RBC QN AUTO: 29.9 PG (ref 26.8–34.3)
MCHC RBC AUTO-ENTMCNC: 33.6 G/DL (ref 31.4–37.4)
MCV RBC AUTO: 89 FL (ref 82–98)
MONOCYTES # BLD AUTO: 0.45 THOUSAND/ΜL (ref 0.17–1.22)
MONOCYTES NFR BLD AUTO: 8 % (ref 4–12)
NEUTROPHILS # BLD AUTO: 3.77 THOUSANDS/ΜL (ref 1.85–7.62)
NEUTS SEG NFR BLD AUTO: 61 % (ref 43–75)
NONHDLC SERPL-MCNC: 204 MG/DL
NRBC BLD AUTO-RTO: 0 /100 WBCS
PLATELET # BLD AUTO: 230 THOUSANDS/UL (ref 149–390)
PMV BLD AUTO: 10.4 FL (ref 8.9–12.7)
POTASSIUM SERPL-SCNC: 4.4 MMOL/L (ref 3.5–5.3)
PROT SERPL-MCNC: 7.2 G/DL (ref 6.4–8.4)
RBC # BLD AUTO: 5.15 MILLION/UL (ref 3.88–5.62)
SODIUM SERPL-SCNC: 138 MMOL/L (ref 135–147)
TIBC SERPL-MCNC: 294 UG/DL (ref 250–450)
TRIGL SERPL-MCNC: 423 MG/DL
TSH SERPL DL<=0.05 MIU/L-ACNC: 1.22 UIU/ML (ref 0.45–4.5)
UIBC SERPL-MCNC: 208 UG/DL (ref 155–355)
VIT B12 SERPL-MCNC: 448 PG/ML (ref 180–914)
WBC # BLD AUTO: 6.03 THOUSAND/UL (ref 4.31–10.16)

## 2024-10-09 PROCEDURE — 83735 ASSAY OF MAGNESIUM: CPT

## 2024-10-09 PROCEDURE — 82728 ASSAY OF FERRITIN: CPT

## 2024-10-09 PROCEDURE — 84443 ASSAY THYROID STIM HORMONE: CPT

## 2024-10-09 PROCEDURE — 80053 COMPREHEN METABOLIC PANEL: CPT

## 2024-10-09 PROCEDURE — 82746 ASSAY OF FOLIC ACID SERUM: CPT

## 2024-10-09 PROCEDURE — 82607 VITAMIN B-12: CPT

## 2024-10-09 PROCEDURE — 83540 ASSAY OF IRON: CPT

## 2024-10-09 PROCEDURE — 80061 LIPID PANEL: CPT

## 2024-10-09 PROCEDURE — 85025 COMPLETE CBC W/AUTO DIFF WBC: CPT

## 2024-10-09 PROCEDURE — 36415 COLL VENOUS BLD VENIPUNCTURE: CPT

## 2024-10-09 PROCEDURE — 83550 IRON BINDING TEST: CPT

## 2024-10-09 NOTE — RESULT ENCOUNTER NOTE
I sent patient a [employer] message stating that;     I just wanted to let you know the ferritin remains mildly elevated 368.  This is stable from 1 year ago.  Please double check and be sure that you are not taking any supplements that contain iron.  Vitamin B12 and folate are okay.  Thyroid function was normal.  CBC is normal thyroid function was normal.  Few other blood test are still pending.  I will notify you w

## 2024-10-10 DIAGNOSIS — K76.0 FATTY LIVER: Primary | ICD-10-CM

## 2024-10-10 DIAGNOSIS — R74.8 ABNORMAL LIVER ENZYMES: ICD-10-CM

## 2024-10-10 NOTE — RESULT ENCOUNTER NOTE
I sent patient a [employer] message stating that;    James Bronson, writing to review recent laboratory test.  As mentioned previously, the ferritin was a little bit elevated but stable.  Remainder the iron studies are normal.  CBC is normal and stable from 1 year ago.  Thyroid function is normal.  Comprehensive metabolic panel did reveal an elevated ALT bit higher than it was last September.  It is currently 71.  Lipid profile did reveal high cholesterol 250 high triglycerides of 423.  It would be important to follow-up with your primary care provider regarding management of hyperlipidemia as this will help with fatty liver.  I would recommend repeating a liver profile in about 3 months to 4 months.  I will place the order.  Please feel free to reach out if you have any questions or concerns.  Best regards,  Dr. Elizabeth

## 2024-10-15 ENCOUNTER — OFFICE VISIT (OUTPATIENT)
Dept: FAMILY MEDICINE CLINIC | Facility: CLINIC | Age: 49
End: 2024-10-15
Payer: COMMERCIAL

## 2024-10-15 VITALS
DIASTOLIC BLOOD PRESSURE: 84 MMHG | BODY MASS INDEX: 32.44 KG/M2 | WEIGHT: 219 LBS | HEART RATE: 78 BPM | SYSTOLIC BLOOD PRESSURE: 130 MMHG | HEIGHT: 69 IN | TEMPERATURE: 98 F | OXYGEN SATURATION: 99 %

## 2024-10-15 DIAGNOSIS — E78.2 MIXED HYPERLIPIDEMIA: ICD-10-CM

## 2024-10-15 DIAGNOSIS — I10 PRIMARY HYPERTENSION: ICD-10-CM

## 2024-10-15 DIAGNOSIS — Z00.00 ANNUAL PHYSICAL EXAM: Primary | ICD-10-CM

## 2024-10-15 DIAGNOSIS — Z23 ENCOUNTER FOR IMMUNIZATION: ICD-10-CM

## 2024-10-15 DIAGNOSIS — Z12.5 SCREENING PSA (PROSTATE SPECIFIC ANTIGEN): ICD-10-CM

## 2024-10-15 PROCEDURE — 90471 IMMUNIZATION ADMIN: CPT

## 2024-10-15 PROCEDURE — 99396 PREV VISIT EST AGE 40-64: CPT | Performed by: NURSE PRACTITIONER

## 2024-10-15 PROCEDURE — 90715 TDAP VACCINE 7 YRS/> IM: CPT

## 2024-10-15 RX ORDER — LISINOPRIL 30 MG/1
30 TABLET ORAL DAILY
Qty: 90 TABLET | Refills: 3 | Status: SHIPPED | OUTPATIENT
Start: 2024-10-15

## 2024-10-15 RX ORDER — ATORVASTATIN CALCIUM 10 MG/1
10 TABLET, FILM COATED ORAL DAILY
Qty: 90 TABLET | Refills: 3 | Status: SHIPPED | OUTPATIENT
Start: 2024-10-15

## 2024-10-15 NOTE — PROGRESS NOTES
Adult Annual Physical  Name: Audie oGode      : 1975      MRN: [id number]  Encounter Provider: DEVANG Wilburn  Encounter Date: 10/15/2024   Encounter department: [facility]    Assessment & Plan  Annual physical exam         Mixed hyperlipidemia    Orders:    atorvastatin (LIPITOR) 10 mg tablet; Take 1 tablet (10 mg total) by mouth daily    Lipid panel; Future    Primary hypertension    Orders:    Comprehensive metabolic panel; Future    lisinopril (ZESTRIL) 30 mg tablet; Take 1 tablet (30 mg total) by mouth daily    Screening PSA (prostate specific antigen)    Orders:    PSA, Total Screen; Future    Encounter for immunization    Orders:    TDAP VACCINE GREATER THAN OR EQUAL TO 6YO IM    Immunizations and preventive care screenings were discussed with patient today. Appropriate education was printed on patient's after visit summary.    Discussed risks and benefits of prostate cancer screening. We discussed the controversial history of PSA screening for prostate cancer in the [place] as well as the risk of over detection and over treatment of prostate cancer by way of PSA screening.  The patient understands that PSA blood testing is an imperfect way to screen for prostate cancer and that elevated PSA levels in the blood may also be caused by infection, inflammation, prostatic trauma or manipulation, urological procedures, or by benign prostatic enlargement.    The role of the digital rectal examination in prostate cancer screening was also discussed and I discussed with him that there is large interobserver variability in the findings of digital rectal examination.  Answers submitted by the patient for this visit:  Medicare Annual Wellness Visit (Submitted on 10/10/2024)  How would you rate your overall health?: good  Compared to last year, how is your physical health?: slightly better  In general, how satisfied are you with your life?: satisfied  Compared to last  year, how is your eyesight?: same  Compared to last year, how is your hearing?: same  Compared to last year, how is your emotional/mental health?: same  How often is anger a problem for you?: never, rarely  How often do you feel unusually tired/fatigued?: never, rarely  In the past 7 days, how much pain have you experienced?: none  In the past 6 months, have you lost or gained 10 pounds without trying?: No  One or more falls in the last year: No  Do you have trouble with the stairs inside or outside your home?: No  Does your home have working smoke alarms?: Yes  Does your home have a carbon monoxide monitor?: Yes  Which safety hazards (if any) have you experienced in your home? Please select all that apply.: none  How would you describe your current diet? Please select all that apply.: Low Carb  In addition to prescription medications, are you taking any over-the-counter supplements?: Yes  If yes, what supplements are you taking?: Vitamins  Can you manage your medications?: Yes  Are you currently taking any opioid medications?: No  Can you walk and transfer into and out of your bed and chair?: Yes  Can you dress and groom yourself?: Yes  Can you bathe or shower yourself?: Yes  Can you feed yourself?: Yes  Can you do your laundry/ housekeeping?: Yes  Can you manage your money, pay your bills, and track your expenses?: Yes  Can you make your own meals?: Yes  Can you do your own shopping?: Yes  Within the last 12 months, have you had any hospitalizations or Emergency Department visits?: No  Do you have a living will?: No  Do you have a Durable POA (Power of ) for healthcare decisions?: No  Do you have an Advanced Directive for end of life decisions?: No  How often have you used an illegal drug (including marijuana) or a prescription medication for non-medical reasons in the past year?: never  What is the typical number of drinks you consume in a day?: 0  What is the typical number of drinks you consume in a  week?: 0  How often did you have a drink containing alcohol in the past year?: monthly or less  How many drinks did you have on a typical day  when you were drinking in the past year?: 1 to 2  How often did you have 6 or more drinks on one occasion in the past year?: never    Counseling:  Alcohol/drug use: discussed moderation in alcohol intake, the recommendations for healthy alcohol use, and avoidance of illicit drug use.  Dental Health: discussed importance of regular tooth brushing, flossing, and dental visits.  Exercise: the importance of regular exercise/physical activity was discussed. Recommend exercise 3-5 times per week for at least 30 minutes.       Depression Screening and Follow-up Plan: Patient was screened for depression during today's encounter. They screened negative with a PHQ-2 score of 0.        History of Present Illness     Adult Annual Physical:  Patient presents for annual physical.     Diet and Physical Activity:  - Diet/Nutrition: well balanced diet, portion control and low carb diet.  - Exercise: strength training exercises, 1-2 times a week on average and moderate cardiovascular exercise.    Depression Screening:  - PHQ-2 Score: 0    General Health:  - Sleep: sleeps well and 7-8 hours of sleep on average.  - Hearing: normal hearing bilateral ears.  - Vision: vision problems.  - Dental: regular dental visits and brushes teeth twice daily.     Health:    - Urinary symptoms: none.     Review of Systems   Constitutional: Negative.  Negative for activity change, appetite change, chills, fatigue and fever.   HENT:  Negative for congestion, ear pain, nosebleeds, rhinorrhea and sore throat.    Eyes:  Negative for photophobia, pain, redness and visual disturbance.   Respiratory: Negative.  Negative for cough, shortness of breath and wheezing.    Cardiovascular: Negative.  Negative for chest pain.   Gastrointestinal: Negative.  Negative for abdominal pain, constipation, diarrhea and vomiting.  "  Endocrine: Negative.    Genitourinary:  Negative for difficulty urinating, dysuria and flank pain.   Musculoskeletal: Negative.    Skin:  Negative for color change and rash.   Neurological: Negative.  Negative for dizziness, weakness, numbness and headaches.   Hematological:  Negative for adenopathy.   Psychiatric/Behavioral:  Negative for agitation and confusion. The patient is not nervous/anxious.      Medical History Reviewed by provider this encounter:         Objective     /84   Pulse 78   Temp 98 °F (36.7 °C)   Ht 5' 9\" (1.753 m)   Wt 99.3 kg (219 lb)   SpO2 99%   BMI 32.34 kg/m²     Physical Exam  Vitals and nursing note reviewed.   Constitutional:       General: He is not in acute distress.     Appearance: He is well-developed. He is not diaphoretic.   HENT:      Head: Normocephalic and atraumatic.      Right Ear: External ear normal.      Left Ear: External ear normal.      Nose: Nose normal.      Mouth/Throat:      Mouth: Oropharynx is clear and moist.   Eyes:      Extraocular Movements: EOM normal.      Conjunctiva/sclera: Conjunctivae normal.      Pupils: Pupils are equal, round, and reactive to light.   Cardiovascular:      Rate and Rhythm: Normal rate and regular rhythm.      Heart sounds: Normal heart sounds. No murmur heard.  Pulmonary:      Effort: Pulmonary effort is normal. No respiratory distress.      Breath sounds: Normal breath sounds. No wheezing or rales.   Abdominal:      General: Bowel sounds are normal. There is no distension.      Palpations: Abdomen is soft.      Tenderness: There is no abdominal tenderness. There is no guarding.      Hernia: No hernia is present.   Musculoskeletal:         General: Normal range of motion.   Skin:     General: Skin is warm and dry.      Capillary Refill: Capillary refill takes less than 2 seconds.      Findings: No erythema or rash.   Neurological:      General: No focal deficit present.      Mental Status: He is alert and oriented to " person, place, and time.   Psychiatric:         Mood and Affect: Mood and affect normal.         Behavior: Behavior normal. Behavior is cooperative.         Thought Content: Thought content normal.

## 2024-10-15 NOTE — PATIENT INSTRUCTIONS
"Patient Education     Routine physical for adults   The Basics   Written by the doctors and editors at [facility]   What is a physical? -- A physical is a routine visit, or \"check-up,\" with your doctor. You might also hear it called a \"wellness visit\" or \"preventive visit.\"  During each visit, the doctor will:   Ask about your physical and mental health   Ask about your habits, behaviors, and lifestyle   Do an exam   Give you vaccines if needed   Talk to you about any medicines you take   Give advice about your health   Answer your questions  Getting regular check-ups is an important part of taking care of your health. It can help your doctor find and treat any problems you have. But it's also important for preventing health problems.  A routine physical is different from a \"sick visit.\" A sick visit is when you see a doctor because of a health concern or problem. Since physicals are scheduled ahead of time, you can think about what you want to ask the doctor.  How often should I get a physical? -- It depends on your age and health. In general, for people age 21 years and older:   If you are younger than 50 years, you might be able to get a physical every 3 years.   If you are 50 years or older, your doctor might recommend a physical every year.  If you have an ongoing health condition, like diabetes or high blood pressure, your doctor will probably want to see you more often.  What happens during a physical? -- In general, each visit will include:   Physical exam - The doctor or nurse will check your height, weight, heart rate, and blood pressure. They will also look at your eyes and ears. They will ask about how you are feeling and whether you have any symptoms that bother you.   Medicines - It's a good idea to bring a list of all the medicines you take to each doctor visit. Your doctor will talk to you about your medicines and answer any questions. Tell them if you are having any side effects that bother you. You " "should also tell them if you are having trouble paying for any of your medicines.   Habits and behaviors - This includes:   Your diet   Your exercise habits   Whether you smoke, drink alcohol, or use drugs   Whether you are sexually active   Whether you feel safe at home  Your doctor will talk to you about things you can do to improve your health and lower your risk of health problems. They will also offer help and support. For example, if you want to quit smoking, they can give you advice and might prescribe medicines. If you want to improve your diet or get more physical activity, they can help you with this, too.   Lab tests, if needed - The tests you get will depend on your age and situation. For example, your doctor might want to check your:   Cholesterol   Blood sugar   Iron level   Vaccines - The recommended vaccines will depend on your age, health, and what vaccines you already had. Vaccines are very important because they can prevent certain serious or deadly infections.   Discussion of screening - \"Screening\" means checking for diseases or other health problems before they cause symptoms. Your doctor can recommend screening based on your age, risk, and preferences. This might include tests to check for:   Cancer, such as breast, prostate, cervical, ovarian, colorectal, prostate, lung, or skin cancer   Sexually transmitted infections, such as chlamydia and gonorrhea   Mental health conditions like depression and anxiety  Your doctor will talk to you about the different types of screening tests. They can help you decide which screenings to have. They can also explain what the results might mean.   Answering questions - The physical is a good time to ask the doctor or nurse questions about your health. If needed, they can refer you to other doctors or specialists, too.  Adults older than 65 years often need other care, too. As you get older, your doctor will talk to you about:   How to prevent falling at " home   Hearing or vision tests   Memory testing   How to take your medicines safely   Making sure that you have the help and support you need at home  All topics are updated as new evidence becomes available and our peer review process is complete.  This topic retrieved from [employer] on: May 02, 2024.  Topic 670693 Version 1.0  Release: 32.4.3 - C32.122  © 2024 UpToDate, Inc. and/or its affiliates. All rights reserved.  Consumer Information Use and Disclaimer   Disclaimer: This generalized information is a limited summary of diagnosis, treatment, and/or medication information. It is not meant to be comprehensive and should be used as a tool to help the user understand and/or assess potential diagnostic and treatment options. It does NOT include all information about conditions, treatments, medications, side effects, or risks that may apply to a specific patient. It is not intended to be medical advice or a substitute for the medical advice, diagnosis, or treatment of a health care provider based on the health care provider's examination and assessment of a patient's specific and unique circumstances. Patients must speak with a health care provider for complete information about their health, medical questions, and treatment options, including any risks or benefits regarding use of medications. This information does not endorse any treatments or medications as safe, effective, or approved for treating a specific patient. UpToDate, Inc. and its affiliates disclaim any warranty or liability relating to this information or the use thereof.The use of this information is governed by the Terms of Use, available at https://www.woltersVoice2Insightuwer.com/en/know/clinical-effectiveness-terms. 2024© UpToDate, Inc. and its affiliates and/or licensors. All rights reserved.  Copyright   © 2024 UpToDate, Inc. and/or its affiliates. All rights reserved.

## 2024-10-15 NOTE — ASSESSMENT & PLAN NOTE
Orders:    Comprehensive metabolic panel; Future    lisinopril (ZESTRIL) 30 mg tablet; Take 1 tablet (30 mg total) by mouth daily

## 2024-10-16 ENCOUNTER — ANESTHESIA EVENT (OUTPATIENT)
Dept: GASTROENTEROLOGY | Facility: HOSPITAL | Age: 49
End: 2024-10-16
Payer: COMMERCIAL

## 2024-10-16 ENCOUNTER — HOSPITAL ENCOUNTER (OUTPATIENT)
Dept: GASTROENTEROLOGY | Facility: HOSPITAL | Age: 49
Setting detail: OUTPATIENT SURGERY
Discharge: HOME | End: 2024-10-16
Attending: INTERNAL MEDICINE
Payer: COMMERCIAL

## 2024-10-16 ENCOUNTER — ANESTHESIA (OUTPATIENT)
Dept: GASTROENTEROLOGY | Facility: HOSPITAL | Age: 49
End: 2024-10-16
Payer: COMMERCIAL

## 2024-10-16 VITALS
TEMPERATURE: 97 F | RESPIRATION RATE: 19 BRPM | HEART RATE: 84 BPM | OXYGEN SATURATION: 95 % | SYSTOLIC BLOOD PRESSURE: 114 MMHG | DIASTOLIC BLOOD PRESSURE: 65 MMHG

## 2024-10-16 DIAGNOSIS — R19.8 ABNORMAL FINDINGS ON ESOPHAGOGASTRODUODENOSCOPY (EGD): ICD-10-CM

## 2024-10-16 DIAGNOSIS — K21.9 GASTROESOPHAGEAL REFLUX DISEASE WITHOUT ESOPHAGITIS: ICD-10-CM

## 2024-10-16 PROCEDURE — 88341 IMHCHEM/IMCYTCHM EA ADD ANTB: CPT | Performed by: PATHOLOGY

## 2024-10-16 PROCEDURE — 88305 TISSUE EXAM BY PATHOLOGIST: CPT | Performed by: PATHOLOGY

## 2024-10-16 PROCEDURE — 88313 SPECIAL STAINS GROUP 2: CPT | Performed by: PATHOLOGY

## 2024-10-16 PROCEDURE — 43239 EGD BIOPSY SINGLE/MULTIPLE: CPT | Performed by: INTERNAL MEDICINE

## 2024-10-16 PROCEDURE — 88342 IMHCHEM/IMCYTCHM 1ST ANTB: CPT | Performed by: PATHOLOGY

## 2024-10-16 RX ORDER — SODIUM CHLORIDE, SODIUM LACTATE, POTASSIUM CHLORIDE, CALCIUM CHLORIDE 600; 310; 30; 20 MG/100ML; MG/100ML; MG/100ML; MG/100ML
125 INJECTION, SOLUTION INTRAVENOUS CONTINUOUS
Status: DISCONTINUED | OUTPATIENT
Start: 2024-10-16 | End: 2024-10-20 | Stop reason: HOSPADM

## 2024-10-16 RX ORDER — PROPOFOL 10 MG/ML
INJECTION, EMULSION INTRAVENOUS AS NEEDED
Status: DISCONTINUED | OUTPATIENT
Start: 2024-10-16 | End: 2024-10-16

## 2024-10-16 RX ORDER — LIDOCAINE HYDROCHLORIDE 20 MG/ML
INJECTION, SOLUTION EPIDURAL; INFILTRATION; INTRACAUDAL; PERINEURAL AS NEEDED
Status: DISCONTINUED | OUTPATIENT
Start: 2024-10-16 | End: 2024-10-16

## 2024-10-16 RX ADMIN — PROPOFOL 100 MG: 10 INJECTION, EMULSION INTRAVENOUS at 11:54

## 2024-10-16 RX ADMIN — TOPICAL ANESTHETIC 1 SPRAY: 200 SPRAY DENTAL; PERIODONTAL at 11:52

## 2024-10-16 RX ADMIN — PROPOFOL 150 MG: 10 INJECTION, EMULSION INTRAVENOUS at 11:51

## 2024-10-16 RX ADMIN — PROPOFOL 50 MG: 10 INJECTION, EMULSION INTRAVENOUS at 11:58

## 2024-10-16 RX ADMIN — SODIUM CHLORIDE, SODIUM LACTATE, POTASSIUM CHLORIDE, AND CALCIUM CHLORIDE 125 ML/HR: .6; .31; .03; .02 INJECTION, SOLUTION INTRAVENOUS at 10:31

## 2024-10-16 RX ADMIN — LIDOCAINE HYDROCHLORIDE 100 MG: 20 INJECTION, SOLUTION EPIDURAL; INFILTRATION; INTRACAUDAL; PERINEURAL at 11:51

## 2024-10-16 NOTE — ANESTHESIA POSTPROCEDURE EVALUATION
Post-Op Assessment Note    Last Filed PACU Vitals:  Vitals Value Taken Time   Temp     Pulse 79    /70    Resp 12    SpO2 97        Modified Saeid:  No data recorded

## 2024-10-16 NOTE — ANESTHESIA PREPROCEDURE EVALUATION
Procedure:  EGD    Relevant Problems   CARDIO   (+) Primary hypertension      GI/HEPATIC   (+) GERD (gastroesophageal reflux disease)   (+) Metabolic dysfunction-associated steatotic liver disease (MASLD)      PULMONARY   (+) Exercise-induced asthma   (+) Shortness of breath        Physical Exam    Airway    Mallampati score: III  TM Distance: >3 FB  Neck ROM: full     Dental   No notable dental hx     Cardiovascular  Rhythm: regular, Rate: normal    Pulmonary   Breath sounds clear to auscultation    Other Findings  Intercisor Distance > 3cm          Anesthesia Plan  ASA Score- 2     Anesthesia Type- IV sedation with anesthesia with ASA Monitors.         Additional Monitors:     Airway Plan:     Comment: NPO appropriate. Discussed benefits/risks of monitored anesthetic care which involves providing a dynamic level of mild to deep sedation. Complications include awareness and/or airway obstruction/aspiration which may necessitate conversion to general anesthesia. All questions answered. Patient understands and wishes to proceed. .       Plan Factors-Exercise tolerance (METS): >4 METS.    Chart reviewed. EKG reviewed.  Existing labs reviewed.                   Induction-     Postoperative Plan- Plan for postoperative opioid use.         Informed Consent- Anesthetic plan and risks discussed with patient.  I personally reviewed this patient with the CRNA. Discussed and agreed on the Anesthesia Plan with the CRNA..

## 2024-10-16 NOTE — H&P
History and Physical - [facility]  Audie Goode 49 y.o. male MRN: [id number]                  HPI: Audie Goode is a 49 y.o. year old male who presents for EGD.  Please refer to my office note dated September 5, 2024 for details of his medical history.  His GERD has been under much better control with the use of omeprazole 40 mg in the morning and famotidine 40 mg in the evening.  He was noted to have mildly prominent folds in the prepyloric region at time of his last endoscopy September 2023.  1 year follow-up was recommended.    Please refer to office note dated September 5, 2024 for details of his medical history.      REVIEW OF SYSTEMS: Per the HPI, and otherwise unremarkable.    Historical Information   Past Medical History:   Diagnosis Date    Colon polyp 2023    GERD (gastroesophageal reflux disease) Years ago    Has gotten worse last few months    Hyperlipidemia      Past Surgical History:   Procedure Laterality Date    COLONOSCOPY      HAND EXTENSOR TENDON EXCISION      KNEE ARTHROSCOPY       Social History   Social History     Substance and Sexual Activity   Alcohol Use Never    Comment: reports socially drinks alcohol     Social History     Substance and Sexual Activity   Drug Use Never     Social History     Tobacco Use   Smoking Status Never   Smokeless Tobacco Never     Family History   Problem Relation Age of Onset    Coronary artery disease Father        Meds/Allergies       Current Outpatient Medications:     budesonide-formoterol (SYMBICORT) 80-4.5 MCG/ACT inhaler    famotidine (PEPCID) 40 MG tablet    lisinopril (ZESTRIL) 30 mg tablet    omeprazole (PriLOSEC) 40 MG capsule    albuterol (Ventolin HFA) 90 mcg/act inhaler    atorvastatin (LIPITOR) 10 mg tablet    Current Facility-Administered Medications:     lactated ringers infusion, 125 mL/hr, Intravenous, Continuous, 125 mL/hr at 10/16/24 1031    No Known Allergies    Objective     /83   Pulse 75   Temp (!)  97.1 °F (36.2 °C) (Temporal)   Resp 18   SpO2 94%       PHYSICAL EXAM    Gen: NAD  Head: NCAT  CV: RRR  CHEST: Clear  ABD: soft, NT/ND  EXT: no edema      ASSESSMENT/PLAN:  This is a 49 y.o. year old male here for egd, and he is stable and optimized for his procedure.

## 2024-10-16 NOTE — ANESTHESIA POSTPROCEDURE EVALUATION
Post-Op Assessment Note    CV Status:  Stable  Pain Score: 0    Pain management: adequate       Mental Status:  Sleepy   Hydration Status:  Euvolemic   PONV Controlled:  Controlled   Airway Patency:  Patent     Post Op Vitals Reviewed: Yes    No anethesia notable event occurred.    Staff: CRNA       Last Filed PACU Vitals:  Vitals Value Taken Time   Temp     Pulse     BP     Resp     SpO2         Modified Saeid:  No data recorded

## 2024-10-16 NOTE — ANESTHESIA POSTPROCEDURE EVALUATION
Post-Op Assessment Note    CV Status:  Stable    Pain management: adequate       Mental Status:  Awake   Hydration Status:  Euvolemic   PONV Controlled:  Controlled   Airway Patency:  Patent     Post Op Vitals Reviewed: Yes    No anethesia notable event occurred.    Staff: Anesthesiologist           Last Filed PACU Vitals:  Vitals Value Taken Time   Temp 97 °F (36.1 °C) 10/16/24 1208   Pulse 84 10/16/24 1212   /65 10/16/24 1212   Resp 19 10/16/24 1212   SpO2 95 % 10/16/24 1212       Modified Saeid:  Activity: 2 (10/16/2024 12:12 PM)  Respiration: 2 (10/16/2024 12:12 PM)  Circulation: 2 (10/16/2024 12:12 PM)  Consciousness: 2 (10/16/2024 12:12 PM)  Oxygen Saturation: 2 (10/16/2024 12:12 PM)  Modified Saeid Score: 10 (10/16/2024 12:12 PM)

## 2024-10-17 ENCOUNTER — TELEPHONE (OUTPATIENT)
Dept: SURGERY | Facility: CLINIC | Age: 49
End: 2024-10-17

## 2024-10-17 ENCOUNTER — OFFICE VISIT (OUTPATIENT)
Dept: LAB | Facility: HOSPITAL | Age: 49
End: 2024-10-17
Payer: COMMERCIAL

## 2024-10-17 DIAGNOSIS — K42.9 UMBILICAL HERNIA WITHOUT OBSTRUCTION AND WITHOUT GANGRENE: ICD-10-CM

## 2024-10-17 LAB
ATRIAL RATE: 68 BPM
P AXIS: 52 DEGREES
PR INTERVAL: 168 MS
QRS AXIS: 0 DEGREES
QRSD INTERVAL: 92 MS
QT INTERVAL: 390 MS
QTC INTERVAL: 414 MS
T WAVE AXIS: 8 DEGREES
VENTRICULAR RATE: 68 BPM

## 2024-10-17 PROCEDURE — 93010 ELECTROCARDIOGRAM REPORT: CPT | Performed by: INTERNAL MEDICINE

## 2024-10-17 PROCEDURE — 93005 ELECTROCARDIOGRAM TRACING: CPT

## 2024-10-24 PROCEDURE — 88342 IMHCHEM/IMCYTCHM 1ST ANTB: CPT | Performed by: PATHOLOGY

## 2024-10-24 PROCEDURE — 88341 IMHCHEM/IMCYTCHM EA ADD ANTB: CPT | Performed by: PATHOLOGY

## 2024-10-24 PROCEDURE — 88313 SPECIAL STAINS GROUP 2: CPT | Performed by: PATHOLOGY

## 2024-10-24 PROCEDURE — 88305 TISSUE EXAM BY PATHOLOGIST: CPT | Performed by: PATHOLOGY

## 2024-10-24 NOTE — RESULT ENCOUNTER NOTE
Please assist in setting up upper endoscopy with endoscopic ultrasound.  This is routine, there is no rush to scheduling this.  Follow-up in my office in about 6 to 8 months.    I sent patient a t-Art message stating that;  Biopsies from the stomach/prominent fold were unremarkable.  Biopsies from another part of the stomach is also unremarkable negative for bacteria called H. pylori.  Biopsy of the distal esophagus did reveal a potential precursor to Whittaker's esophagus but no true Whittaker's esophagus identified on these biopsies.  I feel you should go ahead and schedule an upper endoscopy with endoscopic ultrasound.  There is no rush into scheduling this.  I will send a message to our advanced endoscopy team, we have 3 people in the group that do this procedure.  It is basically similar to an upper endoscopy but there is an ultrasound probe that is placed through the scope to further image the layers of the area of concern in the stomach.  Please feel free to reach out if you have any questions or concerns.  Best regards,  Dr Elizabeth

## 2024-11-07 DIAGNOSIS — R06.02 SHORTNESS OF BREATH: ICD-10-CM

## 2024-11-07 DIAGNOSIS — J45.990 EXERCISE-INDUCED ASTHMA: ICD-10-CM

## 2024-11-07 RX ORDER — ALBUTEROL SULFATE 90 UG/1
INHALANT RESPIRATORY (INHALATION)
Qty: 8.5 G | Refills: 5 | Status: SHIPPED | OUTPATIENT
Start: 2024-11-07

## 2024-11-14 DIAGNOSIS — Z91.89 AT RISK FOR OBSTRUCTIVE SLEEP APNEA: Primary | ICD-10-CM

## 2024-11-14 RX ORDER — DIPHENOXYLATE HYDROCHLORIDE AND ATROPINE SULFATE 2.5; .025 MG/1; MG/1
1 TABLET ORAL DAILY
COMMUNITY

## 2024-11-14 NOTE — PRE-PROCEDURE INSTRUCTIONS
Pre-Surgery Instructions:   Medication Instructions    albuterol (PROVENTIL HFA,VENTOLIN HFA) 90 mcg/act inhaler Uses PRN- OK to take day of surgery    Amino Acids (AMINO ACID PO) Stop taking 7 days prior to surgery.    atorvastatin (LIPITOR) 10 mg tablet Take day of surgery.    budesonide-formoterol (SYMBICORT) 80-4.5 MCG/ACT inhaler Take day of surgery.    famotidine (PEPCID) 40 MG tablet Take night before surgery    lisinopril (ZESTRIL) 30 mg tablet Take night before surgery    multivitamin (THERAGRAN) TABS Stop taking 7 days prior to surgery.    omeprazole (PriLOSEC) 40 MG capsule Take day of surgery.    Prasterone, DHEA, (DHEA PO) Stop taking 7 days prior to surgery.    Probiotic Product (PROBIOTIC PO) Stop taking 7 days prior to surgery.   Medication instructions for day surgery reviewed. Please use only a sip of water to take your instructed medications. Avoid all over the counter vitamins, supplements and NSAIDS for one week prior to surgery per anesthesia guidelines. Tylenol is ok to take as needed.     You will receive a call one business day prior to surgery with an arrival time and hospital directions. If your surgery is scheduled on a Monday, the hospital will be calling you on the Friday prior to your surgery. If you have not heard from anyone by 8pm, please call the hospital supervisor through the hospital  at [phone]. ([place] [phone] or [place] [phone]).    Do not eat or drink anything after midnight the night before your surgery, including candy, mints, lifesavers, or chewing gum. Do not drink alcohol 24hrs before your surgery. Try not to smoke at least 24hrs before your surgery.       Follow the pre surgery showering instructions as listed in the “My Surgical Experience Booklet” or otherwise provided by your surgeon's office. Do not use a blade to shave the surgical area 1 week before surgery. It is okay to use a clean electric clippers up to 24 hours before surgery. Do  not apply any lotions, creams, including makeup, cologne, deodorant, or perfumes after showering on the day of your surgery. Do not use dry shampoo, hair spray, hair gel, or any type of hair products.     No contact lenses, eye make-up, or artificial eyelashes. Remove nail polish, including gel polish, and any artificial, gel, or acrylic nails if possible. Remove all jewelry including rings and body piercing jewelry.     Wear causal clothing that is easy to take on and off. Consider your type of surgery.    Keep any valuables, jewelry, piercings at home. Please bring any specially ordered equipment (sling, braces) if indicated.    Arrange for a responsible person to drive you to and from the hospital on the day of your surgery. Please confirm the visitor policy for the day of your procedure when you receive your phone call with an arrival time.     Call the surgeon's office with any new illnesses, exposures, or additional questions prior to surgery.    Please reference your “My Surgical Experience Booklet” for additional information to prepare for your upcoming surgery.

## 2024-11-25 ENCOUNTER — HOSPITAL ENCOUNTER (OUTPATIENT)
Facility: HOSPITAL | Age: 49
Setting detail: OUTPATIENT SURGERY
Discharge: HOME | End: 2024-11-25
Attending: SURGERY | Admitting: SURGERY
Payer: COMMERCIAL

## 2024-11-25 ENCOUNTER — ANESTHESIA EVENT (OUTPATIENT)
Dept: PERIOP | Facility: HOSPITAL | Age: 49
End: 2024-11-25
Payer: COMMERCIAL

## 2024-11-25 ENCOUNTER — ANESTHESIA (OUTPATIENT)
Dept: PERIOP | Facility: HOSPITAL | Age: 49
End: 2024-11-25
Payer: COMMERCIAL

## 2024-11-25 VITALS
WEIGHT: 210 LBS | RESPIRATION RATE: 16 BRPM | HEIGHT: 69 IN | TEMPERATURE: 98.5 F | BODY MASS INDEX: 31.1 KG/M2 | SYSTOLIC BLOOD PRESSURE: 140 MMHG | DIASTOLIC BLOOD PRESSURE: 78 MMHG | OXYGEN SATURATION: 96 % | HEART RATE: 85 BPM

## 2024-11-25 DIAGNOSIS — K42.9 UMBILICAL HERNIA WITHOUT OBSTRUCTION AND WITHOUT GANGRENE: Primary | ICD-10-CM

## 2024-11-25 PROCEDURE — 49591 RPR AA HRN 1ST < 3 CM RDC: CPT | Performed by: SURGERY

## 2024-11-25 PROCEDURE — C1781 MESH (IMPLANTABLE): HCPCS | Performed by: SURGERY

## 2024-11-25 PROCEDURE — NC001 PR NO CHARGE: Performed by: SURGERY

## 2024-11-25 PROCEDURE — NC001 PR NO CHARGE

## 2024-11-25 DEVICE — FIXATION SECURESTRAP 5 MM ABSORB DISP: Type: IMPLANTABLE DEVICE | Site: UMBILICAL | Status: FUNCTIONAL

## 2024-11-25 DEVICE — VENTRALIGHT ST MESH WITH ECHO 2 POSITIONING SYSTEM, 6" (15 CM), CIRCLE
Type: IMPLANTABLE DEVICE | Site: UMBILICAL | Status: FUNCTIONAL
Brand: VENTRALIGHT

## 2024-11-25 RX ORDER — CEFAZOLIN SODIUM 2 G/50ML
2000 SOLUTION INTRAVENOUS ONCE
Status: COMPLETED | OUTPATIENT
Start: 2024-11-25 | End: 2024-11-25

## 2024-11-25 RX ORDER — MIDAZOLAM HYDROCHLORIDE 2 MG/2ML
INJECTION, SOLUTION INTRAMUSCULAR; INTRAVENOUS AS NEEDED
Status: DISCONTINUED | OUTPATIENT
Start: 2024-11-25 | End: 2024-11-25

## 2024-11-25 RX ORDER — FENTANYL CITRATE 50 UG/ML
INJECTION, SOLUTION INTRAMUSCULAR; INTRAVENOUS AS NEEDED
Status: DISCONTINUED | OUTPATIENT
Start: 2024-11-25 | End: 2024-11-25

## 2024-11-25 RX ORDER — PROPOFOL 10 MG/ML
INJECTION, EMULSION INTRAVENOUS AS NEEDED
Status: DISCONTINUED | OUTPATIENT
Start: 2024-11-25 | End: 2024-11-25

## 2024-11-25 RX ORDER — ROCURONIUM BROMIDE 10 MG/ML
INJECTION, SOLUTION INTRAVENOUS AS NEEDED
Status: DISCONTINUED | OUTPATIENT
Start: 2024-11-25 | End: 2024-11-25

## 2024-11-25 RX ORDER — ONDANSETRON 2 MG/ML
4 INJECTION INTRAMUSCULAR; INTRAVENOUS ONCE AS NEEDED
Status: DISCONTINUED | OUTPATIENT
Start: 2024-11-25 | End: 2024-11-25 | Stop reason: HOSPADM

## 2024-11-25 RX ORDER — ALBUTEROL SULFATE 0.83 MG/ML
2.5 SOLUTION RESPIRATORY (INHALATION) ONCE AS NEEDED
Status: DISCONTINUED | OUTPATIENT
Start: 2024-11-25 | End: 2024-11-25 | Stop reason: HOSPADM

## 2024-11-25 RX ORDER — SODIUM CHLORIDE, SODIUM LACTATE, POTASSIUM CHLORIDE, CALCIUM CHLORIDE 600; 310; 30; 20 MG/100ML; MG/100ML; MG/100ML; MG/100ML
125 INJECTION, SOLUTION INTRAVENOUS CONTINUOUS
Status: DISCONTINUED | OUTPATIENT
Start: 2024-11-25 | End: 2024-11-25 | Stop reason: HOSPADM

## 2024-11-25 RX ORDER — OXYCODONE HYDROCHLORIDE 5 MG/1
5 TABLET ORAL EVERY 4 HOURS PRN
Qty: 10 TABLET | Refills: 0 | Status: SHIPPED | OUTPATIENT
Start: 2024-11-25

## 2024-11-25 RX ORDER — DEXAMETHASONE SODIUM PHOSPHATE 10 MG/ML
INJECTION, SOLUTION INTRAMUSCULAR; INTRAVENOUS AS NEEDED
Status: DISCONTINUED | OUTPATIENT
Start: 2024-11-25 | End: 2024-11-25

## 2024-11-25 RX ORDER — BUPIVACAINE HYDROCHLORIDE 5 MG/ML
INJECTION, SOLUTION EPIDURAL; INTRACAUDAL AS NEEDED
Status: DISCONTINUED | OUTPATIENT
Start: 2024-11-25 | End: 2024-11-25 | Stop reason: HOSPADM

## 2024-11-25 RX ORDER — OXYCODONE HYDROCHLORIDE 5 MG/1
5 TABLET ORAL EVERY 4 HOURS PRN
Refills: 0 | Status: DISCONTINUED | OUTPATIENT
Start: 2024-11-25 | End: 2024-11-25 | Stop reason: HOSPADM

## 2024-11-25 RX ORDER — ONDANSETRON 2 MG/ML
INJECTION INTRAMUSCULAR; INTRAVENOUS AS NEEDED
Status: DISCONTINUED | OUTPATIENT
Start: 2024-11-25 | End: 2024-11-25

## 2024-11-25 RX ORDER — FENTANYL CITRATE/PF 50 MCG/ML
50 SYRINGE (ML) INJECTION
Status: DISCONTINUED | OUTPATIENT
Start: 2024-11-25 | End: 2024-11-25 | Stop reason: HOSPADM

## 2024-11-25 RX ORDER — HYDROMORPHONE HCL/PF 1 MG/ML
0.5 SYRINGE (ML) INJECTION
Status: DISCONTINUED | OUTPATIENT
Start: 2024-11-25 | End: 2024-11-25 | Stop reason: HOSPADM

## 2024-11-25 RX ORDER — LIDOCAINE HYDROCHLORIDE 20 MG/ML
INJECTION, SOLUTION EPIDURAL; INFILTRATION; INTRACAUDAL; PERINEURAL AS NEEDED
Status: DISCONTINUED | OUTPATIENT
Start: 2024-11-25 | End: 2024-11-25

## 2024-11-25 RX ORDER — HEPARIN SODIUM 5000 [USP'U]/ML
5000 INJECTION, SOLUTION INTRAVENOUS; SUBCUTANEOUS ONCE
Status: COMPLETED | OUTPATIENT
Start: 2024-11-25 | End: 2024-11-25

## 2024-11-25 RX ORDER — PROMETHAZINE HYDROCHLORIDE 25 MG/ML
12.5 INJECTION, SOLUTION INTRAMUSCULAR; INTRAVENOUS ONCE AS NEEDED
Status: DISCONTINUED | OUTPATIENT
Start: 2024-11-25 | End: 2024-11-25 | Stop reason: HOSPADM

## 2024-11-25 RX ORDER — HYDROMORPHONE HCL/PF 1 MG/ML
SYRINGE (ML) INJECTION AS NEEDED
Status: DISCONTINUED | OUTPATIENT
Start: 2024-11-25 | End: 2024-11-25

## 2024-11-25 RX ADMIN — OXYCODONE HYDROCHLORIDE 5 MG: 5 TABLET ORAL at 15:18

## 2024-11-25 RX ADMIN — MIDAZOLAM 2 MG: 1 INJECTION INTRAMUSCULAR; INTRAVENOUS at 12:41

## 2024-11-25 RX ADMIN — ROCURONIUM BROMIDE 20 MG: 10 INJECTION, SOLUTION INTRAVENOUS at 13:44

## 2024-11-25 RX ADMIN — DEXAMETHASONE SODIUM PHOSPHATE 10 MG: 10 INJECTION, SOLUTION INTRAMUSCULAR; INTRAVENOUS at 12:54

## 2024-11-25 RX ADMIN — SODIUM CHLORIDE, SODIUM LACTATE, POTASSIUM CHLORIDE, AND CALCIUM CHLORIDE 125 ML/HR: .6; .31; .03; .02 INJECTION, SOLUTION INTRAVENOUS at 12:05

## 2024-11-25 RX ADMIN — Medication 4 MCG: at 12:54

## 2024-11-25 RX ADMIN — ROCURONIUM BROMIDE 50 MG: 10 INJECTION, SOLUTION INTRAVENOUS at 12:49

## 2024-11-25 RX ADMIN — Medication 4 MCG: at 12:55

## 2024-11-25 RX ADMIN — ONDANSETRON 4 MG: 2 INJECTION INTRAMUSCULAR; INTRAVENOUS at 13:59

## 2024-11-25 RX ADMIN — HEPARIN SODIUM 5000 UNITS: 5000 INJECTION, SOLUTION INTRAVENOUS; SUBCUTANEOUS at 12:06

## 2024-11-25 RX ADMIN — FENTANYL CITRATE 100 MCG: 50 INJECTION INTRAMUSCULAR; INTRAVENOUS at 12:49

## 2024-11-25 RX ADMIN — ROCURONIUM BROMIDE 10 MG: 10 INJECTION, SOLUTION INTRAVENOUS at 14:03

## 2024-11-25 RX ADMIN — SODIUM CHLORIDE, SODIUM LACTATE, POTASSIUM CHLORIDE, AND CALCIUM CHLORIDE: .6; .31; .03; .02 INJECTION, SOLUTION INTRAVENOUS at 12:06

## 2024-11-25 RX ADMIN — ROCURONIUM BROMIDE 20 MG: 10 INJECTION, SOLUTION INTRAVENOUS at 13:08

## 2024-11-25 RX ADMIN — HYDROMORPHONE HYDROCHLORIDE 1 MG: 1 INJECTION, SOLUTION INTRAMUSCULAR; INTRAVENOUS; SUBCUTANEOUS at 12:52

## 2024-11-25 RX ADMIN — PROPOFOL 80 MCG/KG/MIN: 10 INJECTION, EMULSION INTRAVENOUS at 12:50

## 2024-11-25 RX ADMIN — LIDOCAINE HYDROCHLORIDE 100 MG: 20 INJECTION, SOLUTION EPIDURAL; INFILTRATION; INTRACAUDAL; PERINEURAL at 12:49

## 2024-11-25 RX ADMIN — SUGAMMADEX 200 MG: 100 INJECTION, SOLUTION INTRAVENOUS at 14:15

## 2024-11-25 RX ADMIN — CEFAZOLIN SODIUM 2000 MG: 2 SOLUTION INTRAVENOUS at 12:41

## 2024-11-25 RX ADMIN — PROPOFOL 300 MG: 10 INJECTION, EMULSION INTRAVENOUS at 12:49

## 2024-11-25 RX ADMIN — ROCURONIUM BROMIDE 10 MG: 10 INJECTION, SOLUTION INTRAVENOUS at 13:30

## 2024-11-25 NOTE — ANESTHESIA PREPROCEDURE EVALUATION
Procedure:  REPAIR HERNIA VENTRAL (UMBILICAL) LAPAROSCOPIC (Abdomen)    Relevant Problems   CARDIO   (+) Primary hypertension      GI/HEPATIC   (+) GERD (gastroesophageal reflux disease)   (+) Metabolic dysfunction-associated steatotic liver disease (MASLD)      PULMONARY   (+) Exercise-induced asthma   (+) Shortness of breath      Lab Results   Component Value Date    SODIUM 138 10/09/2024    K 4.4 10/09/2024     10/09/2024    CO2 26 10/09/2024    AGAP 11 10/09/2024    BUN 28 (H) 10/09/2024    CREATININE 0.97 10/09/2024    GLUC 116 08/15/2018    GLUF 92 10/09/2024    CALCIUM 9.5 10/09/2024    AST 32 10/09/2024    ALT 71 (H) 10/09/2024    ALKPHOS 45 10/09/2024    TP 7.2 10/09/2024    TBILI 0.47 10/09/2024    EGFR 90 10/09/2024     Lab Results   Component Value Date    WBC 6.03 10/09/2024    HGB 15.4 10/09/2024    HCT 45.8 10/09/2024    MCV 89 10/09/2024     10/09/2024         Physical Exam    Airway    Mallampati score: II  TM Distance: >3 FB  Neck ROM: full     Dental       Cardiovascular      Pulmonary      Other Findings        Anesthesia Plan  ASA Score- 2     Anesthesia Type- general with ASA Monitors.         Additional Monitors:     Airway Plan: ETT.           Plan Factors-Exercise tolerance (METS): >4 METS.    Chart reviewed. EKG reviewed. Imaging results reviewed. Existing labs reviewed. Patient summary reviewed.                  Induction- intravenous.    Postoperative Plan-         Informed Consent- Anesthetic plan and risks discussed with patient.  I personally reviewed this patient with the CRNA. Discussed and agreed on the Anesthesia Plan with the CRNA..

## 2024-11-25 NOTE — PROGRESS NOTES
Consult - General Surgery   Audie Goode 49 y.o. male MRN: [id number]  Encounter: [id number]    Assessment & Plan     49M with GERD on PPI, now with an increasingly symptomatic umbilical hernia over the past few years, worse when coughing, desiring elective surgical repair. Discussed individualized management of hernias, various surgical approaches. He would like to proceed with a laparoscopic, possible open repair of umbilical hernia with mesh. Informed consent obtained, discussed risks, benefits, typical perioperative events and recovery. Discussed the importance of keeping his stools soft and easy to pass. Discussed risks of bleeding, infection, recurrence, need for further procedures. Ordered preoperative labs and EKG. He has an upcoming EGD with Dr. Elizabeth in the coming weeks as well. He is up to date on colonoscopy.    History of Present Illness   Chief Complaint   Patient presents with    Umbilical hernia     Aware of umbilical hernia for several years, seen on CT as far back as 2018, initially no symptoms, now increasing in size with coughing and some symptoms, no constipation, no abdominal surgeries, on PPI for GERD, last colonoscopy 2023, due 2026. Has upcoming EGD with Dr. Elizabeth.        Review of Systems   Constitutional: Negative.    Gastrointestinal:         Umbilical hernia    GERD   Genitourinary: Negative.    All other systems reviewed and are negative.      Historical Information   Past Medical History:   Diagnosis Date    Anesthesia complication     woke up during the procedure    Colon polyp 2023    GERD (gastroesophageal reflux disease) Years ago    Has gotten worse last few months    Hyperlipidemia     Hypertension     Kidney stone      Past Surgical History:   Procedure Laterality Date    COLONOSCOPY      HAND EXTENSOR TENDON EXCISION      KNEE ARTHROSCOPY       Social History   Social History     Substance and Sexual Activity   Alcohol Use Yes    Comment: reports socially drinks  "alcohol- once q 2 months     Social History     Substance and Sexual Activity   Drug Use Never     Social History     Tobacco Use   Smoking Status Never   Smokeless Tobacco Never     Family History   Problem Relation Age of Onset    Coronary artery disease Father        Meds/Allergies     Current Outpatient Medications:     famotidine (PEPCID) 40 MG tablet, take 1 tablet by mouth 2 hours BEFORE BEDTIME, Disp: 90 tablet, Rfl: 1    omeprazole (PriLOSEC) 40 MG capsule, take 1 capsule by mouth once daily, Disp: 100 capsule, Rfl: 1    albuterol (PROVENTIL HFA,VENTOLIN HFA) 90 mcg/act inhaler, INHALE 2 PUFFS BY MOUTH AND INTO THE LUNGS EVERY 6 HOURS AS NEEDED FOR WHEEZING, Disp: 8.5 g, Rfl: 5    Amino Acids (AMINO ACID PO), Take by mouth, Disp: , Rfl:     atorvastatin (LIPITOR) 10 mg tablet, Take 1 tablet (10 mg total) by mouth daily, Disp: 90 tablet, Rfl: 3    budesonide-formoterol (SYMBICORT) 80-4.5 MCG/ACT inhaler, inhale 2 puffs by mouth twice a day Rinse mouth after use, Disp: 10.3 g, Rfl: 5    lisinopril (ZESTRIL) 30 mg tablet, Take 1 tablet (30 mg total) by mouth daily (Patient taking differently: Take 30 mg by mouth daily at bedtime), Disp: 90 tablet, Rfl: 3    multivitamin (THERAGRAN) TABS, Take 1 tablet by mouth daily, Disp: , Rfl:     Prasterone, DHEA, (DHEA PO), Take by mouth, Disp: , Rfl:     Probiotic Product (PROBIOTIC PO), Take by mouth, Disp: , Rfl:   No Known Allergies    The following portions of the patient's history were reviewed and updated as appropriate: allergies, current medications, past family history, past medical history, past social history, past surgical history, and problem list.    Objective   Current Vitals:   Blood pressure 150/82, pulse 96, temperature 97.6 °F (36.4 °C), temperature source Temporal, resp. rate 16, height 5' 9\" (1.753 m), weight 97.7 kg (215 lb 6.4 oz), SpO2 99%.    Physical Exam  Vitals reviewed.   Constitutional:       General: He is not in acute distress.     " Appearance: He is not toxic-appearing.   HENT:      Head: Normocephalic.      Nose: No congestion.      Mouth/Throat:      Mouth: Mucous membranes are moist.   Eyes:      Pupils: Pupils are equal, round, and reactive to light.   Cardiovascular:      Rate and Rhythm: Normal rate.   Pulmonary:      Effort: Pulmonary effort is normal.   Abdominal:      General: There is no distension.      Palpations: Abdomen is soft.      Tenderness: There is no guarding or rebound.      Hernia: A hernia is present.      Comments: Reducible umbilical hernia   Musculoskeletal:         General: Normal range of motion.      Cervical back: Normal range of motion.   Skin:     General: Skin is warm.      Capillary Refill: Capillary refill takes less than 2 seconds.   Neurological:      General: No focal deficit present.      Mental Status: He is alert.   Psychiatric:         Mood and Affect: Mood normal.         Signature:  Estefany Renae MD  Date: 11/25/2024 Time: 9:23 AM

## 2024-11-25 NOTE — DISCHARGE INSTR - AVS FIRST PAGE
DISCHARGE INSTRUCTIONS:   Light activity   No heavy lifting, limit lifting to 15-20 pounds for 2 weeks   No limitations for diet   Please take medications as prescribed   Please take acetaminophen/ibuprofen scheduled every 4-6 hours for pain  Please take narcotic pain medication as needed for severe pain   Do not drive if taking narcotic pain medication     If surgical glue is over your incisions, this will peel off on its own  Please do not remove glue prematurely   Please remove dressings in 2 days  You may shower but do not scrub or submerge incisions  Please wear the abdominal binder as often the first few days, then wear as needed for comfort.     Please notify general surgery office if you experience:  Fever over 101.5F  Persistent nausea or vomiting  Severe uncontrolled pain  Redness, tenderness, or signs of infection near incisions (pain, swelling, redness, yellow/green drainage)  Active or persistent bleeding from incisions  Chest pain, shortness of breath     Please call our office with any additional questions or concerns

## 2024-11-25 NOTE — ANESTHESIA POSTPROCEDURE EVALUATION
Post-Op Assessment Note    CV Status:  Stable    Pain management: satisfactory to patient       Mental Status:  Somnolent   Hydration Status:  Euvolemic   PONV Controlled:  Controlled   Airway Patency:  Patent     Post Op Vitals Reviewed: Yes    No anethesia notable event occurred.    Staff: Anesthesiologist, CRNA           Last Filed PACU Vitals:  Vitals Value Taken Time   Temp     Pulse 92    /78    Resp 16    SpO2 100        Modified Saeid:  No data recorded

## 2024-11-25 NOTE — OP NOTE
OPERATIVE REPORT  PATIENT NAME: Audie Goode    :  1975  MRN: [id number]  Pt Location: [facility] OR ROOM 01    SURGERY DATE: 2024    Surgeons and Role:     * Estefany Renae MD - Primary     * Ming Lynch MD - Assisting     * Delma Powell PA-C - Assisting    Preop Diagnosis:  Umbilical hernia without obstruction and without gangrene [K42.9]    Post-Op Diagnosis Codes:     * Umbilical hernia without obstruction and without gangrene [K42.9]    Procedure(s):  REPAIR HERNIA VENTRAL (UMBILICAL) LAPAROSCOPIC    Estimated Blood Loss:   Minimal    Anesthesia Type:   General  Local    Operative Indications:  Umbilical hernia without obstruction and without gangrene [K42.9]  49M with symptomatic fat containing umbilical hernia, consented for laparoscopic, possible open repair.    Operative Findings:  -Prior to opening the fascia, or reducing the contents of the hernia, the hernia defect was measured. The defect measured 3 cm by 2 cm. This was repaired as described in the body of the report below.  -15cm Ventralight ST mesh placed and tacked into place with absorbable tacks covering the umbilical hernia defect and an area of disrupted posterior sheath in the right lateral dissection field with adequate overlap.    Complications:   None    Procedure and Technique:  The patient was taken to the operating room where he was properly identified, monitored and anesthetized with general endotracheal anesthesia. He received heparin and antibiotics perioperatively.  Venodynes used for DVT prophylaxis.  Time-out performed.  Skin prepped and draped.  Skin incised in the left upper quadrant. A blunt-tipped 12 mm trocar was advanced into the peritoneal cavity and pneumoperitoneum established to 15 mm of mercury.  A 5 mm 30 degree scope was advanced.  The 4 quadrants of the peritoneal cavity was inspected laparoscopically.  The ventral hernia was identified. A 2nd 5 mm trocar was placed in the left lower quadrant. All  adhesions of omentum were taken down laparoscopically. A peritoneal flap was created and used to assist in reduction of a large amount of preperitoneal fat within the umbilical hernia. A landing area was entirely cleared for mesh fixation. During my dissection, a small amount of posterior sheath was disrupted on the right lateral aspect of the peritoneal flap. The Ventralight ST mesh was rolled up and delivered through the left upper quadrant trocar site. A stab wound made in the anterior abdominal wall. The grasping device used to grasp the suture on the mesh and secured with a hemostat. The mesh brought up to the anterior abdominal wall and oriented. It was secured in 2 rows with absorabable tacks.  The lattice was removed through the left upper quadrant trocar site. Two additional working ports were placed on the right side of the abdomen to help secure the mesh circumferentially. Satisfied with the lie of the mesh, the procedure completed with closure of the left upper quadrant trocar site with a suture grasping device and an 0 Vicryl suture. The pneumoperitoneum was released. All skin incisions closed with subcuticular 4 Monocryl suture.  Wounds infiltrated with 0.5% Marcaine.  Wounds dressed. Pressure dressing placed at the umbilicus and abdominal binder. The patient extubated and taken to recovery in stable condition.  I was present for the entire procedure, a surgeon assistant was required as a technical guide during the procedure, a physician assistant was required during the procedure for retraction, tissue handling, dissection and suturing.    Patient Disposition:  PACU         SIGNATURE: Estefany Renae MD  DATE: November 25, 2024  TIME: 2:14 PM

## 2024-11-25 NOTE — H&P
H and P - General Surgery   Audie Goode 49 y.o. male MRN: [id number]  Encounter: [id number]     Assessment & Plan  49M with GERD on PPI, now with an increasingly symptomatic umbilical hernia over the past few years, worse when coughing, desiring elective surgical repair. Discussed individualized management of hernias, various surgical approaches. He would like to proceed with a laparoscopic, possible open repair of umbilical hernia with mesh. Informed consent obtained, discussed risks, benefits, typical perioperative events and recovery. Discussed the importance of keeping his stools soft and easy to pass. Discussed risks of bleeding, infection, recurrence, need for further procedures. Ordered preoperative labs and EKG. He has an upcoming EGD with Dr. Elizabeth in the coming weeks as well. He is up to date on colonoscopy.     History of Present Illness      Chief Complaint   Patient presents with    Umbilical hernia      Aware of umbilical hernia for several years, seen on CT as far back as 2018, initially no symptoms, now increasing in size with coughing and some symptoms, no constipation, no abdominal surgeries, on PPI for GERD, last colonoscopy 2023, due 2026. Has upcoming EGD with Dr. Elizabeth.     Review of Systems   Constitutional: Negative.    Gastrointestinal:         Umbilical hernia     GERD   Genitourinary: Negative.    All other systems reviewed and are negative.        Historical Information  Medical History        Past Medical History:   Diagnosis Date    Anesthesia complication       woke up during the procedure    Colon polyp 2023    GERD (gastroesophageal reflux disease) Years ago     Has gotten worse last few months    Hyperlipidemia      Hypertension      Kidney stone           Surgical History         Past Surgical History:   Procedure Laterality Date    COLONOSCOPY        HAND EXTENSOR TENDON EXCISION        KNEE ARTHROSCOPY             Social History  Social History           Substance  and Sexual Activity   Alcohol Use Yes     Comment: reports socially drinks alcohol- once q 2 months      Social History          Substance and Sexual Activity   Drug Use Never      Tobacco Use History   Social History          Tobacco Use   Smoking Status Never   Smokeless Tobacco Never         Family History         Family History   Problem Relation Age of Onset    Coronary artery disease Father              Meds/Allergies    Current Medications      Current Outpatient Medications:     famotidine (PEPCID) 40 MG tablet, take 1 tablet by mouth 2 hours BEFORE BEDTIME, Disp: 90 tablet, Rfl: 1    omeprazole (PriLOSEC) 40 MG capsule, take 1 capsule by mouth once daily, Disp: 100 capsule, Rfl: 1    albuterol (PROVENTIL HFA,VENTOLIN HFA) 90 mcg/act inhaler, INHALE 2 PUFFS BY MOUTH AND INTO THE LUNGS EVERY 6 HOURS AS NEEDED FOR WHEEZING, Disp: 8.5 g, Rfl: 5    Amino Acids (AMINO ACID PO), Take by mouth, Disp: , Rfl:     atorvastatin (LIPITOR) 10 mg tablet, Take 1 tablet (10 mg total) by mouth daily, Disp: 90 tablet, Rfl: 3    budesonide-formoterol (SYMBICORT) 80-4.5 MCG/ACT inhaler, inhale 2 puffs by mouth twice a day Rinse mouth after use, Disp: 10.3 g, Rfl: 5    lisinopril (ZESTRIL) 30 mg tablet, Take 1 tablet (30 mg total) by mouth daily (Patient taking differently: Take 30 mg by mouth daily at bedtime), Disp: 90 tablet, Rfl: 3    multivitamin (THERAGRAN) TABS, Take 1 tablet by mouth daily, Disp: , Rfl:     Prasterone, DHEA, (DHEA PO), Take by mouth, Disp: , Rfl:     Probiotic Product (PROBIOTIC PO), Take by mouth, Disp: , Rfl:      Allergies   No Known Allergies        The following portions of the patient's history were reviewed and updated as appropriate: allergies, current medications, past family history, past medical history, past social history, past surgical history, and problem list.     Objective  Current Vitals:     Vitals:    11/25/24 1144   BP: 140/77   Pulse: 104   Resp: 20   Temp: 98.2 °F (36.8 °C)   SpO2:  95%        Physical Exam  Vitals reviewed.   Constitutional:       General: He is not in acute distress.     Appearance: He is not toxic-appearing.   HENT:      Head: Normocephalic.      Nose: No congestion.      Mouth/Throat:      Mouth: Mucous membranes are moist.   Eyes:      Pupils: Pupils are equal, round, and reactive to light.   Cardiovascular:      Rate and Rhythm: Normal rate.   Pulmonary:      Effort: Pulmonary effort is normal.   Abdominal:      General: There is no distension.      Palpations: Abdomen is soft.      Tenderness: There is no guarding or rebound.      Hernia: A hernia is present.      Comments: Reducible umbilical hernia   Musculoskeletal:         General: Normal range of motion.      Cervical back: Normal range of motion.   Skin:     General: Skin is warm.      Capillary Refill: Capillary refill takes less than 2 seconds.   Neurological:      General: No focal deficit present.      Mental Status: He is alert.   Psychiatric:         Mood and Affect: Mood normal.

## 2024-11-26 NOTE — ANESTHESIA POSTPROCEDURE EVALUATION
Post-Op Assessment Note    CV Status:  Stable    Pain management: adequate       Mental Status:  Alert and awake   Hydration Status:  Euvolemic   PONV Controlled:  Controlled   Airway Patency:  Patent     Post Op Vitals Reviewed: Yes    No anethesia notable event occurred.    Staff: Anesthesiologist           Last Filed PACU Vitals:  Vitals Value Taken Time   Temp 97.5 °F (36.4 °C) 11/25/24 1424   Pulse 89 11/25/24 1455   /68 11/25/24 1455   Resp 18 11/25/24 1455   SpO2 94 % 11/25/24 1455       Modified Saeid:  Activity: 2 (11/25/2024  4:00 PM)  Respiration: 2 (11/25/2024  4:00 PM)  Circulation: 2 (11/25/2024  4:00 PM)  Consciousness: 2 (11/25/2024  4:00 PM)  Oxygen Saturation: 2 (11/25/2024  4:00 PM)  Modified Saeid Score: 10 (11/25/2024  4:00 PM)

## 2024-11-29 DIAGNOSIS — K21.9 GASTROESOPHAGEAL REFLUX DISEASE WITHOUT ESOPHAGITIS: ICD-10-CM

## 2024-11-29 RX ORDER — OMEPRAZOLE 40 MG/1
40 CAPSULE, DELAYED RELEASE ORAL DAILY
Qty: 100 CAPSULE | Refills: 1 | Status: SHIPPED | OUTPATIENT
Start: 2024-11-29

## 2024-12-18 ENCOUNTER — OFFICE VISIT (OUTPATIENT)
Dept: SURGERY | Facility: CLINIC | Age: 49
End: 2024-12-18
Payer: COMMERCIAL

## 2024-12-18 VITALS
HEART RATE: 88 BPM | HEIGHT: 69 IN | RESPIRATION RATE: 16 BRPM | TEMPERATURE: 97.6 F | OXYGEN SATURATION: 96 % | WEIGHT: 213.6 LBS | SYSTOLIC BLOOD PRESSURE: 128 MMHG | DIASTOLIC BLOOD PRESSURE: 80 MMHG | BODY MASS INDEX: 31.64 KG/M2

## 2024-12-18 DIAGNOSIS — Z87.19 S/P LAPAROSCOPIC HERNIA REPAIR: Primary | ICD-10-CM

## 2024-12-18 DIAGNOSIS — Z98.890 S/P LAPAROSCOPIC HERNIA REPAIR: Primary | ICD-10-CM

## 2024-12-18 PROBLEM — K42.9 UMBILICAL HERNIA WITHOUT OBSTRUCTION AND WITHOUT GANGRENE: Status: RESOLVED | Noted: 2024-09-05 | Resolved: 2024-12-18

## 2024-12-18 PROCEDURE — 99212 OFFICE O/P EST SF 10 MIN: CPT | Performed by: SURGERY

## 2024-12-18 NOTE — PROGRESS NOTES
"Post-Op Note - General Surgery   Audie Goode 49 y.o. male MRN: [id number]  Encounter: [id number]    Assessment & Plan     49M 3 weeks status post laparoscopic repair of ventral/umbilical hernia with mesh, recovering very well. Incisions healing, tolerating a diet, moving his bowels, driving, ambulating, not requiring pain medication. Discussed slow return to normal activities and exercise. Exam is reassuring today. He will contact us in the future on an as needed basis.    Subjective      Chief Complaint   Patient presents with    Post-op     Doing well, minimal pain medicine required initially, now off of everything, driving, feeling well. Slowly returning to normal activities, no constipation.       Review of Systems   Constitutional:  Positive for activity change. Negative for fever.   Gastrointestinal: Negative.    Skin:  Positive for wound. Negative for color change.   Hematological: Negative.    All other systems reviewed and are negative.      The following portions of the patient's history were reviewed and updated as appropriate: allergies, current medications, past family history, past medical history, past social history, past surgical history, and problem list.    Objective      Blood pressure 128/80, pulse 88, temperature 97.6 °F (36.4 °C), temperature source Temporal, resp. rate 16, height 5' 9\" (1.753 m), weight 96.9 kg (213 lb 9.6 oz), SpO2 96%.   Physical Exam  Vitals reviewed.   Constitutional:       General: He is not in acute distress.     Appearance: He is not toxic-appearing.   HENT:      Head: Normocephalic.      Nose: No congestion.      Mouth/Throat:      Mouth: Mucous membranes are moist.   Eyes:      Pupils: Pupils are equal, round, and reactive to light.   Cardiovascular:      Rate and Rhythm: Normal rate.   Pulmonary:      Effort: Pulmonary effort is normal. No respiratory distress.   Abdominal:      General: There is no distension.      Palpations: Abdomen is soft. There is no " mass.      Tenderness: There is no abdominal tenderness. There is no guarding or rebound.      Hernia: No hernia is present.      Comments: Incisions healing well  No recurrent hernia present   Musculoskeletal:         General: Normal range of motion.      Cervical back: Normal range of motion.   Skin:     General: Skin is warm and dry.      Capillary Refill: Capillary refill takes less than 2 seconds.      Findings: No erythema.   Neurological:      General: No focal deficit present.      Mental Status: He is alert. Mental status is at baseline.   Psychiatric:         Mood and Affect: Mood normal.         Signature:  Estefany Renae MD  Date: 12/18/2024 Time: 10:47 AM

## 2025-02-10 ENCOUNTER — TELEPHONE (OUTPATIENT)
Age: 50
End: 2025-02-10

## 2025-02-10 ENCOUNTER — PREP FOR PROCEDURE (OUTPATIENT)
Dept: GASTROENTEROLOGY | Facility: MEDICAL CENTER | Age: 50
End: 2025-02-10

## 2025-02-10 DIAGNOSIS — K31.89 MUCOSAL ABNORMALITY OF STOMACH: Primary | ICD-10-CM

## 2025-02-10 NOTE — TELEPHONE ENCOUNTER
Pt calling in to go over results from EGD in October. Reviewed with pt Dr. Elizabeth's recommendations. Pt verified that [facility] team has not called him yet to schedule his EUS. Informed pt that this RN would send message to them to call pt to get him scheduled.

## 2025-03-12 ENCOUNTER — TELEPHONE (OUTPATIENT)
Age: 50
End: 2025-03-12

## 2025-03-12 NOTE — TELEPHONE ENCOUNTER
Pt calling to schedule his Endoscopic Ultrasound, Referral is in the system.    PT would like a call as soon as possible to schedule at [phone]

## 2025-04-15 ENCOUNTER — ANESTHESIA (OUTPATIENT)
Dept: GASTROENTEROLOGY | Facility: HOSPITAL | Age: 50
End: 2025-04-15
Payer: COMMERCIAL

## 2025-04-15 ENCOUNTER — ANESTHESIA EVENT (OUTPATIENT)
Dept: ANESTHESIOLOGY | Facility: HOSPITAL | Age: 50
End: 2025-04-15

## 2025-04-15 ENCOUNTER — HOSPITAL ENCOUNTER (OUTPATIENT)
Dept: GASTROENTEROLOGY | Facility: HOSPITAL | Age: 50
Setting detail: OUTPATIENT SURGERY
Discharge: HOME | End: 2025-04-15
Payer: COMMERCIAL

## 2025-04-15 ENCOUNTER — ANESTHESIA EVENT (OUTPATIENT)
Dept: GASTROENTEROLOGY | Facility: HOSPITAL | Age: 50
End: 2025-04-15
Payer: COMMERCIAL

## 2025-04-15 ENCOUNTER — ANESTHESIA (OUTPATIENT)
Dept: ANESTHESIOLOGY | Facility: HOSPITAL | Age: 50
End: 2025-04-15

## 2025-04-15 VITALS
SYSTOLIC BLOOD PRESSURE: 139 MMHG | OXYGEN SATURATION: 94 % | BODY MASS INDEX: 31.1 KG/M2 | RESPIRATION RATE: 18 BRPM | DIASTOLIC BLOOD PRESSURE: 80 MMHG | TEMPERATURE: 96.7 F | WEIGHT: 210 LBS | HEIGHT: 69 IN | HEART RATE: 86 BPM

## 2025-04-15 DIAGNOSIS — K31.89 MUCOSAL ABNORMALITY OF STOMACH: ICD-10-CM

## 2025-04-15 PROCEDURE — 43237 ENDOSCOPIC US EXAM ESOPH: CPT | Performed by: INTERNAL MEDICINE

## 2025-04-15 RX ORDER — GLYCOPYRROLATE 0.2 MG/ML
INJECTION INTRAMUSCULAR; INTRAVENOUS AS NEEDED
Status: DISCONTINUED | OUTPATIENT
Start: 2025-04-15 | End: 2025-04-15

## 2025-04-15 RX ORDER — LIDOCAINE HYDROCHLORIDE 20 MG/ML
INJECTION, SOLUTION EPIDURAL; INFILTRATION; INTRACAUDAL; PERINEURAL AS NEEDED
Status: DISCONTINUED | OUTPATIENT
Start: 2025-04-15 | End: 2025-04-15

## 2025-04-15 RX ORDER — FENTANYL CITRATE 50 UG/ML
INJECTION, SOLUTION INTRAMUSCULAR; INTRAVENOUS AS NEEDED
Status: DISCONTINUED | OUTPATIENT
Start: 2025-04-15 | End: 2025-04-15

## 2025-04-15 RX ORDER — SODIUM CHLORIDE 9 MG/ML
INJECTION, SOLUTION INTRAVENOUS CONTINUOUS PRN
Status: DISCONTINUED | OUTPATIENT
Start: 2025-04-15 | End: 2025-04-15

## 2025-04-15 RX ORDER — PROPOFOL 10 MG/ML
INJECTION, EMULSION INTRAVENOUS CONTINUOUS PRN
Status: DISCONTINUED | OUTPATIENT
Start: 2025-04-15 | End: 2025-04-15

## 2025-04-15 RX ORDER — PROPOFOL 10 MG/ML
INJECTION, EMULSION INTRAVENOUS AS NEEDED
Status: DISCONTINUED | OUTPATIENT
Start: 2025-04-15 | End: 2025-04-15

## 2025-04-15 RX ORDER — KETAMINE HCL IN NACL, ISO-OSM 100MG/10ML
SYRINGE (ML) INJECTION AS NEEDED
Status: DISCONTINUED | OUTPATIENT
Start: 2025-04-15 | End: 2025-04-15

## 2025-04-15 RX ADMIN — LIDOCAINE HYDROCHLORIDE 40 MG: 20 INJECTION, SOLUTION EPIDURAL; INFILTRATION; INTRACAUDAL; PERINEURAL at 10:02

## 2025-04-15 RX ADMIN — PROPOFOL 100 MG: 10 INJECTION, EMULSION INTRAVENOUS at 10:05

## 2025-04-15 RX ADMIN — FENTANYL CITRATE 50 MCG: 50 INJECTION INTRAMUSCULAR; INTRAVENOUS at 10:02

## 2025-04-15 RX ADMIN — FENTANYL CITRATE 25 MCG: 50 INJECTION INTRAMUSCULAR; INTRAVENOUS at 10:10

## 2025-04-15 RX ADMIN — PROPOFOL 25 MG: 10 INJECTION, EMULSION INTRAVENOUS at 10:07

## 2025-04-15 RX ADMIN — PROPOFOL 130 MCG/KG/MIN: 10 INJECTION, EMULSION INTRAVENOUS at 10:10

## 2025-04-15 RX ADMIN — PROPOFOL 25 MG: 10 INJECTION, EMULSION INTRAVENOUS at 10:08

## 2025-04-15 RX ADMIN — FENTANYL CITRATE 25 MCG: 50 INJECTION INTRAMUSCULAR; INTRAVENOUS at 10:13

## 2025-04-15 RX ADMIN — GLYCOPYRROLATE 0.1 MG: 0.2 INJECTION, SOLUTION INTRAMUSCULAR; INTRAVENOUS at 10:02

## 2025-04-15 RX ADMIN — SODIUM CHLORIDE: 0.9 INJECTION, SOLUTION INTRAVENOUS at 10:00

## 2025-04-15 RX ADMIN — Medication 20 MG: at 10:05

## 2025-04-15 RX ADMIN — PROPOFOL 25 MG: 10 INJECTION, EMULSION INTRAVENOUS at 10:06

## 2025-04-15 NOTE — ANESTHESIA PREPROCEDURE EVALUATION
"Procedure:  PRE-OP ONLY    Relevant Problems   CARDIO   (+) Primary hypertension      GI/HEPATIC   (+) GERD (gastroesophageal reflux disease)   (+) Metabolic dysfunction-associated steatotic liver disease (MASLD)      MUSCULOSKELETAL   (+) Exercise-induced asthma      PULMONARY   (+) Shortness of breath      Prominent folds in the prepyloric region for EUS    EKG 10/2024:  Normal sinus rhythm  Nonspecific T wave abnormality  No previous ECGs available  Confirmed by Ming Ybarra ([id number]) on 10/17/2024 10:38:44 AM    Lab Results   Component Value Date    WBC 6.03 10/09/2024    HGB 15.4 10/09/2024    HCT 45.8 10/09/2024    MCV 89 10/09/2024     10/09/2024     Lab Results   Component Value Date    SODIUM 138 10/09/2024    K 4.4 10/09/2024     10/09/2024    CO2 26 10/09/2024    BUN 28 (H) 10/09/2024    CREATININE 0.97 10/09/2024    GLUC 116 08/15/2018    CALCIUM 9.5 10/09/2024     No results found for: \"INR\", \"PROTIME\"  Lab Results   Component Value Date    HGBA1C 5.2 04/08/2022                 Anesthesia Plan  ASA Score- 2     Anesthesia Type- IV sedation with anesthesia with ASA Monitors.         Additional Monitors:     Airway Plan:            Plan Factors-    Chart reviewed. EKG reviewed.  Existing labs reviewed. Patient summary reviewed.                  Induction- intravenous.    Postoperative Plan-         Informed Consent-       NPO Status:  No vitals data found for the desired time range.        "

## 2025-04-15 NOTE — ANESTHESIA PREPROCEDURE EVALUATION
"Procedure:  ENDOSCOPIC ULTRASOUND (UPPER)    Relevant Problems   ANESTHESIA (within normal limits)      CARDIO   (+) Primary hypertension      ENDO (within normal limits)      GI/HEPATIC   (+) GERD (gastroesophageal reflux disease)   (+) Metabolic dysfunction-associated steatotic liver disease (MASLD)      /RENAL (within normal limits)      HEMATOLOGY (within normal limits)      MUSCULOSKELETAL   (+) Exercise-induced asthma      PULMONARY   (+) Shortness of breath      Other   (+) Chronic cough   (+) Class 1 obesity due to excess calories without serious comorbidity with body mass index (BMI) of 31.0 to 31.9 in adult   (+) History of colon polyps      Prominent folds in the prepyloric region for EUS    EKG 10/2024:  Normal sinus rhythm  Nonspecific T wave abnormality  No previous ECGs available  Confirmed by Ming Ybarra ([id number]) on 10/17/2024 10:38:44 AM    Lab Results   Component Value Date    WBC 6.03 10/09/2024    HGB 15.4 10/09/2024    HCT 45.8 10/09/2024    MCV 89 10/09/2024     10/09/2024     Lab Results   Component Value Date    SODIUM 138 10/09/2024    K 4.4 10/09/2024     10/09/2024    CO2 26 10/09/2024    BUN 28 (H) 10/09/2024    CREATININE 0.97 10/09/2024    GLUC 116 08/15/2018    CALCIUM 9.5 10/09/2024     No results found for: \"INR\", \"PROTIME\"  Lab Results   Component Value Date    HGBA1C 5.2 04/08/2022            Physical Exam    Airway    Mallampati score: I  TM Distance: >3 FB  Neck ROM: full     Dental   No notable dental hx     Cardiovascular  Cardiovascular exam normal    Pulmonary  Pulmonary exam normal     Other Findings        Anesthesia Plan  ASA Score- 2     Anesthesia Type- IV sedation with anesthesia with ASA Monitors.         Additional Monitors:     Airway Plan:            Plan Factors-Exercise tolerance (METS): >4 METS.    Chart reviewed. EKG reviewed.  Existing labs reviewed. Patient summary reviewed.                  Induction- intravenous.    Postoperative Plan- "         Informed Consent- Anesthetic plan and risks discussed with patient.  I personally reviewed this patient with the CRNA. Discussed and agreed on the Anesthesia Plan with the CRNA..      NPO Status:  No vitals data found for the desired time range.

## 2025-04-15 NOTE — ANESTHESIA POSTPROCEDURE EVALUATION
Post-Op Assessment Note    CV Status:  Stable    Pain management: adequate       Mental Status:  Lethargic and sleepy   Hydration Status:  Stable   PONV Controlled:  None   Airway Patency:  Patent  Two or more mitigation strategies used for obstructive sleep apnea   Post Op Vitals Reviewed: Yes    No anethesia notable event occurred.    Staff: CRNA         Last Filed PACU Vitals:  Vitals Value Taken Time   Temp 96.7 °F (35.9 °C) 04/15/25 1021   Pulse 80 04/15/25 1021   /72 04/15/25 1021   Resp 16 04/15/25 1021   SpO2 97 % 04/15/25 1021       Modified Saeid:     Vitals Value Taken Time   Activity 2 04/15/25 1021   Respiration 2 04/15/25 1021   Circulation 2 04/15/25 1021   Consciousness 0 04/15/25 1021   Oxygen Saturation 2 04/15/25 1021     Modified Saeid Score: 8

## 2025-04-24 ENCOUNTER — APPOINTMENT (OUTPATIENT)
Dept: LAB | Facility: CLINIC | Age: 50
End: 2025-04-24
Attending: INTERNAL MEDICINE
Payer: COMMERCIAL

## 2025-04-24 DIAGNOSIS — E78.2 MIXED HYPERLIPIDEMIA: ICD-10-CM

## 2025-04-24 DIAGNOSIS — R74.8 ABNORMAL LIVER ENZYMES: ICD-10-CM

## 2025-04-24 DIAGNOSIS — K76.0 FATTY LIVER: ICD-10-CM

## 2025-04-24 DIAGNOSIS — I10 PRIMARY HYPERTENSION: ICD-10-CM

## 2025-04-24 DIAGNOSIS — Z12.5 SCREENING PSA (PROSTATE SPECIFIC ANTIGEN): ICD-10-CM

## 2025-04-24 LAB
ALBUMIN SERPL BCG-MCNC: 4.9 G/DL (ref 3.5–5)
ALP SERPL-CCNC: 49 U/L (ref 34–104)
ALT SERPL W P-5'-P-CCNC: 65 U/L (ref 7–52)
ANION GAP SERPL CALCULATED.3IONS-SCNC: 8 MMOL/L (ref 4–13)
AST SERPL W P-5'-P-CCNC: 30 U/L (ref 13–39)
BILIRUB DIRECT SERPL-MCNC: 0.03 MG/DL (ref 0–0.2)
BILIRUB SERPL-MCNC: 0.47 MG/DL (ref 0.2–1)
BUN SERPL-MCNC: 31 MG/DL (ref 5–25)
CALCIUM SERPL-MCNC: 9.6 MG/DL (ref 8.4–10.2)
CHLORIDE SERPL-SCNC: 104 MMOL/L (ref 96–108)
CHOLEST SERPL-MCNC: 186 MG/DL (ref ?–200)
CO2 SERPL-SCNC: 27 MMOL/L (ref 21–32)
CREAT SERPL-MCNC: 1.03 MG/DL (ref 0.6–1.3)
GFR SERPL CREATININE-BSD FRML MDRD: 83 ML/MIN/1.73SQ M
GLUCOSE P FAST SERPL-MCNC: 105 MG/DL (ref 65–99)
HDLC SERPL-MCNC: 53 MG/DL
LDLC SERPL CALC-MCNC: 90 MG/DL (ref 0–100)
NONHDLC SERPL-MCNC: 133 MG/DL
POTASSIUM SERPL-SCNC: 4.1 MMOL/L (ref 3.5–5.3)
PROT SERPL-MCNC: 7.4 G/DL (ref 6.4–8.4)
PSA SERPL-MCNC: 1.1 NG/ML (ref 0–4)
SODIUM SERPL-SCNC: 139 MMOL/L (ref 135–147)
TRIGL SERPL-MCNC: 213 MG/DL (ref ?–150)

## 2025-04-24 PROCEDURE — 80061 LIPID PANEL: CPT

## 2025-04-24 PROCEDURE — 80053 COMPREHEN METABOLIC PANEL: CPT

## 2025-04-24 PROCEDURE — 36415 COLL VENOUS BLD VENIPUNCTURE: CPT

## 2025-04-24 PROCEDURE — G0103 PSA SCREENING: HCPCS

## 2025-04-24 PROCEDURE — 82248 BILIRUBIN DIRECT: CPT

## 2025-04-25 ENCOUNTER — RESULTS FOLLOW-UP (OUTPATIENT)
Dept: GASTROENTEROLOGY | Facility: CLINIC | Age: 50
End: 2025-04-25

## 2025-04-25 ENCOUNTER — OFFICE VISIT (OUTPATIENT)
Dept: FAMILY MEDICINE CLINIC | Facility: CLINIC | Age: 50
End: 2025-04-25
Payer: COMMERCIAL

## 2025-04-25 VITALS
WEIGHT: 223 LBS | HEART RATE: 105 BPM | OXYGEN SATURATION: 97 % | SYSTOLIC BLOOD PRESSURE: 132 MMHG | TEMPERATURE: 98.2 F | BODY MASS INDEX: 33.03 KG/M2 | DIASTOLIC BLOOD PRESSURE: 80 MMHG | HEIGHT: 69 IN

## 2025-04-25 DIAGNOSIS — R73.9 HYPERGLYCEMIA: Primary | ICD-10-CM

## 2025-04-25 DIAGNOSIS — I10 PRIMARY HYPERTENSION: Primary | ICD-10-CM

## 2025-04-25 DIAGNOSIS — K21.9 GASTROESOPHAGEAL REFLUX DISEASE WITHOUT ESOPHAGITIS: ICD-10-CM

## 2025-04-25 DIAGNOSIS — J45.990 EXERCISE-INDUCED ASTHMA: ICD-10-CM

## 2025-04-25 DIAGNOSIS — E78.2 MIXED HYPERLIPIDEMIA: ICD-10-CM

## 2025-04-25 DIAGNOSIS — R79.9 ELEVATED BUN: ICD-10-CM

## 2025-04-25 PROCEDURE — 99214 OFFICE O/P EST MOD 30 MIN: CPT | Performed by: NURSE PRACTITIONER

## 2025-04-25 RX ORDER — METOPROLOL SUCCINATE 50 MG/1
50 TABLET, EXTENDED RELEASE ORAL DAILY
Qty: 30 TABLET | Refills: 2 | Status: SHIPPED | OUTPATIENT
Start: 2025-04-25

## 2025-04-25 NOTE — RESULT ENCOUNTER NOTE
Please arrange for office follow-up with me for about 4 to 5 months.  Please see if we can add a hemoglobin A1c onto his recent labs.  I will go ahead and place an order.    I sent patient a [employer] message stating that;  James Mehta  I am writing to review some recent lab results.  Looks like your triglycerides are high at 213 improved from 423 6 months ago.  Cholesterol is also improved at 186.  Chemistry panel continues to reveal mildly elevated ALT at 65.  Glucose is high at 105.  I would recommend continuing tight control of your cholesterol and and try to keep good control of your blood sugar.  Your doctor may want to check a hemoglobin A1c given the elevated glucose.  I will see if I can add that onto your labs that you just had but it may not be possible.  I would also recommend that you follow-up with me in the office at some point it does not look like you have an appointment at this time.  I will ask him to schedule something for about 4 to 5 months.  Please feel free to reach out if any questions or concerns,

## 2025-04-25 NOTE — ASSESSMENT & PLAN NOTE
Orders:  •  metoprolol succinate (TOPROL-XL) 50 mg 24 hr tablet; Take 1 tablet (50 mg total) by mouth daily  •  Basic metabolic panel; Future

## 2025-04-25 NOTE — PROGRESS NOTES
"Name: Audie Goode      : 1975      MRN: [id number]  Encounter Provider: DVEANG Wilburn  Encounter Date: 2025   Encounter department: [facility]  :  Assessment & Plan  Primary hypertension    Orders:  •  metoprolol succinate (TOPROL-XL) 50 mg 24 hr tablet; Take 1 tablet (50 mg total) by mouth daily  •  Basic metabolic panel; Future    Gastroesophageal reflux disease without esophagitis         Exercise-induced asthma         Elevated BUN  - has been on lisinopril, since starting continues to be elevated.  Will switch to metoprolol  Recheck labs in 6 weeks and follow up for BP check.        Mixed hyperlipidemia  - continue lipitor. Cholesterol improved.              Depression Screening and Follow-up Plan: Patient was screened for depression during today's encounter. They screened negative with a PHQ-2 score of 0.        History of Present Illness   Patient here for routine follow up visit. Recent hernia surgery about 6 months ago, went back to the gym about 4 months ago. HTN is stable,taking medications.      Cholesterol levels have improved. Chol 186. LDL 90.         Review of Systems   Constitutional: Negative.  Negative for fatigue and fever.   Respiratory: Negative.  Negative for shortness of breath and wheezing.    Neurological: Negative.  Negative for dizziness, light-headedness and headaches.       Objective   /80   Pulse 105   Temp 98.2 °F (36.8 °C)   Ht 5' 9\" (1.753 m)   Wt 101 kg (223 lb)   SpO2 97%   BMI 32.93 kg/m²      Physical Exam  Vitals and nursing note reviewed.   Constitutional:       General: He is not in acute distress.     Appearance: He is well-developed. He is not diaphoretic.   Cardiovascular:      Rate and Rhythm: Normal rate and regular rhythm.      Heart sounds: Normal heart sounds. No murmur heard.  Pulmonary:      Effort: Pulmonary effort is normal. No respiratory distress.      Breath sounds: Normal breath sounds. No wheezing. "   Abdominal:      General: Bowel sounds are normal. There is no distension.      Palpations: Abdomen is soft.   Musculoskeletal:         General: Normal range of motion.   Skin:     General: Skin is warm and dry.      Capillary Refill: Capillary refill takes less than 2 seconds.      Findings: No erythema or rash.   Neurological:      Mental Status: He is alert.   Psychiatric:         Behavior: Behavior normal. Behavior is cooperative.         Thought Content: Thought content normal.

## 2025-04-28 NOTE — TELEPHONE ENCOUNTER
----- Message from Kyle Elizabeth DO sent at 4/25/2025  3:19 PM EDT -----  Please arrange for office follow-up with me for about 4 to 5 months.  Please see if we can add a hemoglobin A1c onto his recent labs.  I will go ahead and place an order.    I sent patient a [employer] message stating that;  James Mehta  I am writing to review some recent lab results.  Looks like your triglycerides are high at 213 improved from 423 6 months ago.  Cholesterol is also improved at 186.  Chemistry panel continues to reveal mildly elevated ALT at 65.  Glucose is high at 105.  I would recommend continuing tight control of your cholesterol and and try to keep good control of your blood sugar.  Your doctor may want to check a hemoglobin A1c given the elevated glucose.  I will see if I can add that onto your labs that you just had but it may not be possible.  I would also recommend that you follow-up with me in the office at some point it does not look like you have an appointment at this time.  I will ask him to schedule something for about 4 to 5 months.  Please feel free to reach out if any questions or concerns,

## 2025-04-28 NOTE — TELEPHONE ENCOUNTER
----- Message from Kyle Elizabeth DO sent at 4/25/2025  3:19 PM EDT -----  Please arrange for office follow-up with me for about 4 to 5 months.  Please see if we can add a hemoglobin A1c onto his recent labs.  I will go ahead and place an order.    I sent patient a [employer] message stating that;  James Mehta  I am writing to review some recent lab results.  Looks like your triglycerides are high at 213 improved from 423 6 months ago.  Cholesterol is also improved at 186.  Chemistry panel continues to reveal mildly elevated ALT at 65.  Glucose is high at 105.  I would recommend continuing tight control of your cholesterol and and try to keep good control of your blood sugar.  Your doctor may want to check a hemoglobin A1c given the elevated glucose.  I will see if I can add that onto your labs that you just had but it may not be possible.  I would also recommend that you follow-up with me in the office at some point it does not look like you have an appointment at this time.  I will ask him to schedule something for about 4 to 5 months.  Please feel free to reach out if any questions or concerns,

## 2025-04-28 NOTE — TELEPHONE ENCOUNTER
Pt called stated he is returning a call for results. Patients GI provider:       Number to return call: [phone]    Reason for call: Pt called stated he is returning a call for results.    Scheduled procedure/appointment date if applicable: Apt/procedure

## 2025-04-28 NOTE — TELEPHONE ENCOUNTER
Spoke with patient reviewed results and pt verbalized understanding.     A1C could not be added will get the A1C drawn the beginning of June when he is going to be getting labs done for his PCP.    Appt scheduled 10/06/21852 @ 830am

## 2025-05-08 DIAGNOSIS — K21.9 GASTROESOPHAGEAL REFLUX DISEASE WITHOUT ESOPHAGITIS: ICD-10-CM

## 2025-05-08 RX ORDER — FAMOTIDINE 40 MG/1
40 TABLET, FILM COATED ORAL DAILY
Qty: 90 TABLET | Refills: 1 | Status: SHIPPED | OUTPATIENT
Start: 2025-05-08

## 2025-06-10 ENCOUNTER — APPOINTMENT (OUTPATIENT)
Dept: LAB | Facility: CLINIC | Age: 50
End: 2025-06-10
Payer: COMMERCIAL

## 2025-06-10 DIAGNOSIS — R73.9 HYPERGLYCEMIA: ICD-10-CM

## 2025-06-10 DIAGNOSIS — I10 PRIMARY HYPERTENSION: ICD-10-CM

## 2025-06-10 LAB
ANION GAP SERPL CALCULATED.3IONS-SCNC: 9 MMOL/L (ref 4–13)
BUN SERPL-MCNC: 23 MG/DL (ref 5–25)
CALCIUM SERPL-MCNC: 9.7 MG/DL (ref 8.4–10.2)
CHLORIDE SERPL-SCNC: 103 MMOL/L (ref 96–108)
CO2 SERPL-SCNC: 29 MMOL/L (ref 21–32)
CREAT SERPL-MCNC: 1 MG/DL (ref 0.6–1.3)
EST. AVERAGE GLUCOSE BLD GHB EST-MCNC: 114 MG/DL
GFR SERPL CREATININE-BSD FRML MDRD: 86 ML/MIN/1.73SQ M
GLUCOSE P FAST SERPL-MCNC: 110 MG/DL (ref 65–99)
HBA1C MFR BLD: 5.6 %
POTASSIUM SERPL-SCNC: 4.2 MMOL/L (ref 3.5–5.3)
SODIUM SERPL-SCNC: 141 MMOL/L (ref 135–147)

## 2025-06-10 PROCEDURE — 36415 COLL VENOUS BLD VENIPUNCTURE: CPT

## 2025-06-10 PROCEDURE — 80048 BASIC METABOLIC PNL TOTAL CA: CPT

## 2025-06-10 PROCEDURE — 83036 HEMOGLOBIN GLYCOSYLATED A1C: CPT

## 2025-06-11 ENCOUNTER — RESULTS FOLLOW-UP (OUTPATIENT)
Dept: GASTROENTEROLOGY | Facility: CLINIC | Age: 50
End: 2025-06-11

## 2025-06-12 ENCOUNTER — OFFICE VISIT (OUTPATIENT)
Dept: FAMILY MEDICINE CLINIC | Facility: CLINIC | Age: 50
End: 2025-06-12
Payer: COMMERCIAL

## 2025-06-12 VITALS
BODY MASS INDEX: 33.03 KG/M2 | SYSTOLIC BLOOD PRESSURE: 148 MMHG | OXYGEN SATURATION: 96 % | WEIGHT: 223 LBS | DIASTOLIC BLOOD PRESSURE: 86 MMHG | TEMPERATURE: 97.8 F | HEIGHT: 69 IN | RESPIRATION RATE: 16 BRPM | HEART RATE: 88 BPM

## 2025-06-12 DIAGNOSIS — K21.9 GASTROESOPHAGEAL REFLUX DISEASE WITHOUT ESOPHAGITIS: Primary | ICD-10-CM

## 2025-06-12 DIAGNOSIS — Z12.5 SCREENING PSA (PROSTATE SPECIFIC ANTIGEN): ICD-10-CM

## 2025-06-12 DIAGNOSIS — E78.2 MIXED HYPERLIPIDEMIA: ICD-10-CM

## 2025-06-12 DIAGNOSIS — I10 PRIMARY HYPERTENSION: ICD-10-CM

## 2025-06-12 PROCEDURE — 99214 OFFICE O/P EST MOD 30 MIN: CPT | Performed by: NURSE PRACTITIONER

## 2025-06-12 RX ORDER — METOPROLOL SUCCINATE 50 MG/1
75 TABLET, EXTENDED RELEASE ORAL DAILY
Qty: 45 TABLET | Refills: 3 | Status: SHIPPED | OUTPATIENT
Start: 2025-06-12

## 2025-06-12 NOTE — PROGRESS NOTES
"Name: Audie Goode      : 1975      MRN: [id number]  Encounter Provider: DEVANG Wilburn  Encounter Date: 2025   Encounter department: [facility]IMARY [facility]  :  Assessment & Plan  Primary hypertension  Increase metoprolol to  Orders:  •  metoprolol succinate (TOPROL-XL) 50 mg 24 hr tablet; Take 1.5 tablets (75 mg total) by mouth daily  •  Comprehensive metabolic panel; Future  •  Hemoglobin A1C; Future    Gastroesophageal reflux disease without esophagitis    Orders:  •  Comprehensive metabolic panel; Future  •  Hemoglobin A1C; Future    Mixed hyperlipidemia    Orders:  •  Lipid panel; Future    Screening PSA (prostate specific antigen)    Orders:  •  PSA, Total Screen; Future           History of Present Illness   Patient here for follow up visit.  Doing good.  Tcomplaint with medicaiton. BP is elevated today.  On metoprolol 50mg xl.  Doing good. Some fatigue. Taking lipitor. Labs reviewed. Doing good. BUN improved with switching from lisinopril to metoprolol.       Review of Systems   Constitutional: Negative.  Negative for fatigue and fever.   Respiratory: Negative.  Negative for shortness of breath and wheezing.    Cardiovascular: Negative.  Negative for palpitations.   Neurological:  Negative for dizziness, light-headedness and headaches.       Objective   /86   Pulse 88   Temp 97.8 °F (36.6 °C) (Temporal)   Resp 16   Ht 5' 9\" (1.753 m)   Wt 101 kg (223 lb)   SpO2 96%   BMI 32.93 kg/m²      Physical Exam  Vitals and nursing note reviewed.   Constitutional:       General: He is not in acute distress.     Appearance: He is well-developed. He is not diaphoretic.     Cardiovascular:      Rate and Rhythm: Normal rate and regular rhythm.      Heart sounds: Normal heart sounds. No murmur heard.  Pulmonary:      Effort: Pulmonary effort is normal. No respiratory distress.      Breath sounds: Normal breath sounds. No wheezing.   Abdominal:      General: Bowel sounds are " normal. There is no distension.      Palpations: Abdomen is soft.      Tenderness: There is no abdominal tenderness.     Musculoskeletal:         General: Normal range of motion.     Skin:     General: Skin is warm and dry.      Capillary Refill: Capillary refill takes less than 2 seconds.      Findings: No erythema or rash.     Neurological:      General: No focal deficit present.      Mental Status: He is alert and oriented to person, place, and time.     Psychiatric:         Behavior: Behavior normal. Behavior is cooperative.         Thought Content: Thought content normal.

## 2025-06-12 NOTE — ASSESSMENT & PLAN NOTE
Increase metoprolol to  Orders:  •  metoprolol succinate (TOPROL-XL) 50 mg 24 hr tablet; Take 1.5 tablets (75 mg total) by mouth daily  •  Comprehensive metabolic panel; Future  •  Hemoglobin A1C; Future

## 2025-06-16 DIAGNOSIS — K21.9 GASTROESOPHAGEAL REFLUX DISEASE WITHOUT ESOPHAGITIS: ICD-10-CM

## 2025-06-16 RX ORDER — OMEPRAZOLE 40 MG/1
40 CAPSULE, DELAYED RELEASE ORAL DAILY
Qty: 30 CAPSULE | Refills: 5 | Status: SHIPPED | OUTPATIENT
Start: 2025-06-16

## 2025-06-27 ENCOUNTER — CLINICAL SUPPORT (OUTPATIENT)
Dept: FAMILY MEDICINE CLINIC | Facility: CLINIC | Age: 50
End: 2025-06-27
Payer: COMMERCIAL

## 2025-06-27 ENCOUNTER — TELEPHONE (OUTPATIENT)
Dept: FAMILY MEDICINE CLINIC | Facility: CLINIC | Age: 50
End: 2025-06-27

## 2025-06-27 VITALS — HEART RATE: 77 BPM | OXYGEN SATURATION: 97 % | SYSTOLIC BLOOD PRESSURE: 130 MMHG | DIASTOLIC BLOOD PRESSURE: 82 MMHG

## 2025-06-27 DIAGNOSIS — I10 PRIMARY HYPERTENSION: Primary | ICD-10-CM

## 2025-06-27 PROCEDURE — 99211 OFF/OP EST MAY X REQ PHY/QHP: CPT

## 2025-06-27 NOTE — TELEPHONE ENCOUNTER
Patient reports to the office this morning for a BP check. At his last visit his metoprolol (50mg) was upped to 1.5 tabs daily and he has been taking this since last visit. Reports no signs or symptoms of high BP since upping medication.    Vitals today     BP - 130/82  Pulse - 77  O2Sat - 97%

## 2025-07-02 NOTE — TELEPHONE ENCOUNTER
Patient returning call stated he notice he was cutting in half another medication and he does not know which one. Patient stated he can continue to check his blood pressure for another week and follow up. Asked if he should continue taking 1.5 tablets or keep taking 1 tablet and monitor BP.     Requested a call back. Please advise, thank you.

## 2025-07-02 NOTE — TELEPHONE ENCOUNTER
What has his BP been at home?  I would like him to take the 75mg of the metoprolol and check his BP at home and call the office in a week with a few of the readings or his HR and BP.

## 2025-07-16 DIAGNOSIS — I10 PRIMARY HYPERTENSION: ICD-10-CM

## 2025-07-19 DIAGNOSIS — I10 PRIMARY HYPERTENSION: ICD-10-CM

## 2025-07-22 RX ORDER — METOPROLOL SUCCINATE 50 MG/1
50 TABLET, EXTENDED RELEASE ORAL DAILY
Qty: 30 TABLET | OUTPATIENT
Start: 2025-07-22

## 2025-07-22 RX ORDER — METOPROLOL SUCCINATE 50 MG/1
75 TABLET, EXTENDED RELEASE ORAL DAILY
Qty: 45 TABLET | Refills: 5 | Status: SHIPPED | OUTPATIENT
Start: 2025-07-22

## 2025-07-27 DIAGNOSIS — E78.2 MIXED HYPERLIPIDEMIA: ICD-10-CM

## 2025-07-28 RX ORDER — ATORVASTATIN CALCIUM 10 MG/1
10 TABLET, FILM COATED ORAL DAILY
Qty: 90 TABLET | Refills: 1 | Status: SHIPPED | OUTPATIENT
Start: 2025-07-28

## 2025-08-04 DIAGNOSIS — K21.9 GASTROESOPHAGEAL REFLUX DISEASE WITHOUT ESOPHAGITIS: ICD-10-CM

## 2025-08-05 RX ORDER — OMEPRAZOLE 40 MG/1
40 CAPSULE, DELAYED RELEASE ORAL DAILY
Qty: 30 CAPSULE | Refills: 0 | Status: SHIPPED | OUTPATIENT
Start: 2025-08-05

## (undated) DEVICE — GAUZE SPONGES,16 PLY: Brand: CURITY

## (undated) DEVICE — TROCAR SITE CLOSURE DEVICE: Brand: ENDO CLOSE

## (undated) DEVICE — ENDOPATH XCEL BLADELESS TROCARS WITH STABILITY SLEEVES: Brand: ENDOPATH XCEL

## (undated) DEVICE — GLOVE SRG BIOGEL 7

## (undated) DEVICE — GAUZE SPONGES,8 PLY: Brand: CURITY

## (undated) DEVICE — 3M™ TEGADERM™ TRANSPARENT FILM DRESSING FRAME STYLE, 1624W, 2-3/8 IN X 2-3/4 IN (6 CM X 7 CM), 100/CT 4CT/CASE: Brand: 3M™ TEGADERM™

## (undated) DEVICE — DISPOSABLE OR TOWEL: Brand: CARDINAL HEALTH

## (undated) DEVICE — SUT VICRYL PLUS 0 UR-6 27IN VCP603H

## (undated) DEVICE — CHLORAPREP HI-LITE 26ML ORANGE

## (undated) DEVICE — 3M™ TEGADERM™ CHG DRESSING 25/CARTON 4 CARTONS/CASE 1659: Brand: TEGADERM™

## (undated) DEVICE — 5 MM CURVED DISSECTORS WITH MONOPOLAR CAUTERY: Brand: ENDOPATH

## (undated) DEVICE — TUBE SET SMOKE EVAC PNEUMOCLEAR HIGH FLOW

## (undated) DEVICE — SPONGE GAUZE 2 X 2 4PLY STRL

## (undated) DEVICE — NEEDLE SPINAL 22G X 5IN QUINCKE

## (undated) DEVICE — NEEDLE 25G X 1 1/2

## (undated) DEVICE — GARMENT,MEDLINE,DVT,INT,CALF,FOAM,MED: Brand: MEDLINE

## (undated) DEVICE — 4-PORT MANIFOLD: Brand: NEPTUNE 2

## (undated) DEVICE — INTENDED FOR TISSUE SEPARATION, AND OTHER PROCEDURES THAT REQUIRE A SHARP SURGICAL BLADE TO PUNCTURE OR CUT.: Brand: BARD-PARKER ® CARBON RIB-BACK BLADES

## (undated) DEVICE — MICRO HVTSA, 0.5G AND HVTSA SOURCEMARK PRODUCT CODE M1206 AND M1206-01: Brand: EXOFIN MICRO HVTSA, 0.5G

## (undated) DEVICE — SUT MONOCRYL 4-0 PS-2 27 IN Y426H

## (undated) DEVICE — PACK PBDS LAP CHOLE RF

## (undated) DEVICE — HARMONIC 1100 SHEARS, 36CM SHAFT LENGTH: Brand: HARMONIC

## (undated) DEVICE — TROCAR: Brand: KII FIOS FIRST ENTRY

## (undated) DEVICE — GLOVE INDICATOR PI UNDERGLOVE SZ 7 BLUE

## (undated) DEVICE — LAPAROSCOPIC SCISSORS: Brand: EPIX LAPAROSCOPIC SCISSORS